# Patient Record
Sex: FEMALE | Race: WHITE | Employment: OTHER | URBAN - METROPOLITAN AREA
[De-identification: names, ages, dates, MRNs, and addresses within clinical notes are randomized per-mention and may not be internally consistent; named-entity substitution may affect disease eponyms.]

---

## 2017-04-13 ENCOUNTER — HOSPITAL ENCOUNTER (EMERGENCY)
Age: 72
Discharge: HOME | End: 2017-04-13
Attending: EMERGENCY MEDICINE
Payer: MEDICARE

## 2017-04-13 ENCOUNTER — APPOINTMENT (OUTPATIENT)
Dept: GENERAL RADIOLOGY | Age: 72
End: 2017-04-13
Attending: EMERGENCY MEDICINE
Payer: MEDICARE

## 2017-04-13 VITALS
RESPIRATION RATE: 16 BRPM | SYSTOLIC BLOOD PRESSURE: 142 MMHG | DIASTOLIC BLOOD PRESSURE: 88 MMHG | HEIGHT: 62 IN | HEART RATE: 82 BPM | OXYGEN SATURATION: 98 % | BODY MASS INDEX: 24.34 KG/M2 | WEIGHT: 132.28 LBS | TEMPERATURE: 98 F

## 2017-04-13 DIAGNOSIS — S42.202A CLOSED FRACTURE OF PROXIMAL END OF LEFT HUMERUS, UNSPECIFIED FRACTURE MORPHOLOGY, INITIAL ENCOUNTER: ICD-10-CM

## 2017-04-13 DIAGNOSIS — S90.112A CONTUSION OF LEFT GREAT TOE WITHOUT DAMAGE TO NAIL, INITIAL ENCOUNTER: Primary | ICD-10-CM

## 2017-04-13 PROCEDURE — 99282 EMERGENCY DEPT VISIT SF MDM: CPT

## 2017-04-13 PROCEDURE — 73660 X-RAY EXAM OF TOE(S): CPT

## 2017-04-13 RX ORDER — HYDROCODONE BITARTRATE AND ACETAMINOPHEN 5; 325 MG/1; MG/1
1 TABLET ORAL
Qty: 20 TAB | Refills: 0 | Status: SHIPPED | OUTPATIENT
Start: 2017-04-13 | End: 2017-11-18

## 2017-04-13 NOTE — ED TRIAGE NOTES
Patient fell on Tuesday while playing with her grandchildren. She was seen at [facility] and was diagnosed with a fracture of the humerus. She was given a sling and told to follow up with orthopedics. She could not get an appointment until next week and her pain was unmanageable with ibuprofen so she's here for pain management. She was not prescribed any pain medicine on Tuesday. She also has ecchymosis to left great toe with pain and limited ROM. No X-ray was performed on toe.

## 2017-04-13 NOTE — ED PROVIDER NOTES
HPI Comments: Alfredo Rose is a 73 y.o. F with PMHx significant for HTN and Cancer who presents ambulatory to ED [facility] ED c/o continuing left arm pain s/p fall on 4/11/17. She also endorses left big toe pain. Pt was seen at [facility] and was diagnosed with a proximal fracture of the humerus. She was given Ibuprofen, placed in a sling and referred to follow up with orthopedics. Pt has not yet secured appointment with orthopedics, and states that her pain was unmanageable with ibuprofen so she is in ED for pain management. She denies any new injury since 4/11. Pt denies any allergies to medications. She specifically denies any fever, nausea, vomiting or diarrhea. There are no other complaints, changes, or physical findings at this time. The history is provided by the patient. Past Medical History:   Diagnosis Date    Cancer ([address]     vulva    Hypertension        Past Surgical History:   Procedure Laterality Date    HX GYN           No family history on file. Social History     Social History    Marital status:      Spouse name: N/A    Number of children: N/A    Years of education: N/A     Occupational History    Not on file. Social History Main Topics    Smoking status: Never Smoker    Smokeless tobacco: Never Used    Alcohol use No    Drug use: No    Sexual activity: Not on file     Other Topics Concern    Not on file     Social History Narrative    No narrative on file         ALLERGIES: Pcn [penicillins]    Review of Systems   Constitutional: Negative for fatigue and fever. HENT: Negative for ear pain and sore throat. Eyes: Negative for pain, redness and visual disturbance. Respiratory: Negative for cough and shortness of breath. Cardiovascular: Negative for chest pain and palpitations. Gastrointestinal: Negative for abdominal pain, diarrhea, nausea and vomiting. Genitourinary: Negative for dysuria, frequency and urgency.    Musculoskeletal: Negative for back pain, gait problem, neck pain and neck stiffness. Positive for left arm pain  Positive for left big toe pain   Skin: Negative for rash and wound. Neurological: Negative for dizziness, weakness, light-headedness, numbness and headaches. All other systems reviewed and are negative. Vitals:    04/13/17 1645   BP: 142/88   Pulse: 82   Resp: 16   Temp: 98 °F (36.7 °C)   SpO2: 98%   Weight: 60 kg (132 lb 4.4 oz)   Height: 5' 2\" (1.575 m)            Physical Exam   Constitutional: She is oriented to person, place, and time. She appears well-developed and well-nourished. Non-toxic appearance. No distress. HENT:   Head: Normocephalic and atraumatic. Right Ear: External ear normal.   Left Ear: External ear normal.   Nose: Nose normal.   Mouth/Throat: Uvula is midline. No trismus in the jaw. Eyes: Conjunctivae and EOM are normal. Pupils are equal, round, and reactive to light. No scleral icterus. Neck: Normal range of motion and full passive range of motion without pain. Cardiovascular: Normal rate and regular rhythm. Pulmonary/Chest: Effort normal. No accessory muscle usage. No tachypnea. No respiratory distress. She has no decreased breath sounds. She has no wheezes. Abdominal: Soft. There is no tenderness. Musculoskeletal: Normal range of motion. Neurological: She is alert and oriented to person, place, and time. She is not disoriented. No cranial nerve deficit. GCS eye subscore is 4. GCS verbal subscore is 5. GCS motor subscore is 6. Tender contusion to the left big toe, no break in the skin   Skin: Skin is intact. No rash noted. Psychiatric: She has a normal mood and affect. Her speech is normal.   Nursing note and vitals reviewed. MDM  Number of Diagnoses or Management Options  Contusion of left great toe without damage to nail, initial encounter:   Diagnosis management comments: DDx: Fracture, Sprain, Strain, Contusion.         Amount and/or Complexity of Data Reviewed  Tests in the radiology section of CPT®: ordered and reviewed  Review and summarize past medical records: yes    Patient Progress  Patient progress: stable    ED Course       Procedures    PROGRESS NOTE:  7:17 PM  Post-op shoe applied by nursing to patients left foot. Written by Diogenes Cervantes ED Scribe, as dictated by Josette Chaidez    IMAGING RESULTS:  XR GREAT TOE LT MIN 2 V   Final Result   EXAM: XR GREAT TOE LT MIN 2 V     INDICATION: fall on Tuesday with swelling and ecchymosis.     COMPARISON: None.     FINDINGS: Three views of the left great toe demonstrate no fracture or other  acute abnormality.     IMPRESSION  IMPRESSION: No acute abnormality. IMPRESSION:  1. Contusion of left great toe without damage to nail, initial encounter    2. Closed fracture of proximal end of left humerus, unspecified fracture morphology, initial encounter        PLAN:  1. Current Discharge Medication List      START taking these medications    Details   HYDROcodone-acetaminophen (NORCO) 5-325 mg per tablet Take 1 Tab by mouth every four (4) hours as needed for Pain. Max Daily Amount: 6 Tabs. Qty: 20 Tab, Refills: 0         CONTINUE these medications which have NOT CHANGED    Details   fluconazole (DIFLUCAN) 100 mg tablet Take 100 mg by mouth daily. diclofenac EC (VOLTAREN) 75 mg EC tablet Take 75 mg by mouth two (2) times a day. HYDROcodone-acetaminophen 5-500 mg cap Take 1 Tab by mouth every six (6) hours as needed. docusate sodium (COLACE) 100 mg capsule Take 100 mg by mouth two (2) times daily as needed. promethazine (PHENERGAN) 25 mg tablet Take 25 mg by mouth every six (6) hours as needed. ranitidine (ZANTAC) 75 mg tablet Take 75 mg by mouth daily as needed. acetaminophen (TYLENOL) 325 mg tablet Take 650 mg by mouth every four (4) hours as needed. calcium 600 mg cap Take 2 Caps by mouth daily.       meclizine (ANTIVERT) 12.5 mg tablet Take 12.5 mg by mouth three (3) times daily as needed. simvastatin (ZOCOR) 20 mg tablet Take 20 mg by mouth daily. aspirin 81 mg tablet Take 81 mg by mouth daily. 2.   Follow-up Information     Follow up With Details Comments [address] Reyes MD Schedule an appointment as soon as possible for a visit ORTHO: call tomorrow to schedule follow up Luana Murry  [phone]          Return to ED if worse     DISCHARGE NOTE:  7:18 PM  The patient's results have been reviewed with family and/or caregiver. They verbally convey their understanding and agreement of the patient's signs, symptoms, diagnosis, treatment, and prognosis. They additionally agree to follow up as recommended in the discharge instructions or to return to the Emergency Room should the patient's condition change prior to their follow-up appointment. The family and/or caregiver verbally agrees with the care-plan and all of their questions have been answered. The discharge instructions have also been provided to the them along with educational information regarding the patient's diagnosis and a list of reasons why the patient would want to return to the ER prior to their follow-up appointment should their condition change. Written by Jesusita Tripp. Robin Rushing, ED Scribe, as dictated by Marci Garner. Attestations: This note is prepared by Jesusita Tripp. Billy, acting as Scribe for Marci Garner. FLIP Grimm: The scribe's documentation has been prepared under my direction and personally reviewed by me in its entirety. I confirm that the note above accurately reflects all work, treatment, procedures, and medical decision making performed by me.

## 2017-04-13 NOTE — ROUTINE PROCESS
The ED provider reviewed discharge instructions with the patient. The patient verbalized understanding.

## 2017-04-13 NOTE — DISCHARGE INSTRUCTIONS
Thank you for allowing us to provide you with care today. We hope we addressed all of your concerns and needs. We strive to provide excellent quality care in the Emergency Department. Please rate us as excellent, as anything less than excellent does not meet our expectations. If you feel that you have not received excellent quality care or timely care, please ask to speak to the nurse manager. Please choose us in the future for your continued health care needs. The exam and treatment you received in the Emergency Department were for an urgent problem and are not intended as complete care. It is important that you follow-up with a doctor, nurse practitioner, or  [id number] assistant to: (1) confirm your diagnosis, (2) re-evaluation of changes in your illness and treatment, and (3) for ongoing care. If your symptoms become worse or you do not improve as expected and you are unable to reach your usual health care provider, you should return to the Emergency Department. We are available 24 hours a day. Take this sheet with you when you go to your follow-up visit. If you have any problem arranging the follow-up visit, contact the Emergency Department immediately. Make an appointment with your Primary Care doctor for follow up of this visit. Return to the ER if you are unable to be seen in the time recommended on your discharge instructions.

## 2017-11-18 ENCOUNTER — APPOINTMENT (OUTPATIENT)
Dept: CT IMAGING | Age: 72
End: 2017-11-18
Attending: EMERGENCY MEDICINE
Payer: MEDICARE

## 2017-11-18 ENCOUNTER — HOSPITAL ENCOUNTER (EMERGENCY)
Age: 72
Discharge: HOME | End: 2017-11-18
Attending: EMERGENCY MEDICINE
Payer: MEDICARE

## 2017-11-18 VITALS
WEIGHT: 140.65 LBS | OXYGEN SATURATION: 94 % | SYSTOLIC BLOOD PRESSURE: 143 MMHG | RESPIRATION RATE: 18 BRPM | DIASTOLIC BLOOD PRESSURE: 67 MMHG | HEART RATE: 76 BPM | BODY MASS INDEX: 26.56 KG/M2 | TEMPERATURE: 98.4 F | HEIGHT: 61 IN

## 2017-11-18 DIAGNOSIS — R42 DIZZINESS: Primary | ICD-10-CM

## 2017-11-18 LAB
ANION GAP SERPL CALC-SCNC: 7 MMOL/L (ref 5–15)
APPEARANCE UR: CLEAR
BACTERIA URNS QL MICRO: NEGATIVE /HPF
BASOPHILS # BLD: 0 K/UL (ref 0–0.1)
BASOPHILS NFR BLD: 1 % (ref 0–1)
BILIRUB UR QL: NEGATIVE
BUN SERPL-MCNC: 11 MG/DL (ref 6–20)
BUN/CREAT SERPL: 16 (ref 12–20)
CALCIUM SERPL-MCNC: 8.5 MG/DL (ref 8.5–10.1)
CHLORIDE SERPL-SCNC: 110 MMOL/L (ref 97–108)
CO2 SERPL-SCNC: 27 MMOL/L (ref 21–32)
COLOR UR: NORMAL
CREAT SERPL-MCNC: 0.68 MG/DL (ref 0.55–1.02)
EOSINOPHIL # BLD: 0.1 K/UL (ref 0–0.4)
EOSINOPHIL NFR BLD: 1 % (ref 0–7)
EPITH CASTS URNS QL MICRO: NORMAL /LPF
ERYTHROCYTE [DISTWIDTH] IN BLOOD BY AUTOMATED COUNT: 13 % (ref 11.5–14.5)
GLUCOSE SERPL-MCNC: 82 MG/DL (ref 65–100)
GLUCOSE UR STRIP.AUTO-MCNC: NEGATIVE MG/DL
HCT VFR BLD AUTO: 41.8 % (ref 35–47)
HGB BLD-MCNC: 13.7 G/DL (ref 11.5–16)
HGB UR QL STRIP: NEGATIVE
KETONES UR QL STRIP.AUTO: NEGATIVE MG/DL
LEUKOCYTE ESTERASE UR QL STRIP.AUTO: NEGATIVE
LYMPHOCYTES # BLD: 1.6 K/UL (ref 0.8–3.5)
LYMPHOCYTES NFR BLD: 25 % (ref 12–49)
MCH RBC QN AUTO: 28.8 PG (ref 26–34)
MCHC RBC AUTO-ENTMCNC: 32.8 G/DL (ref 30–36.5)
MCV RBC AUTO: 87.8 FL (ref 80–99)
MONOCYTES # BLD: 0.5 K/UL (ref 0–1)
MONOCYTES NFR BLD: 8 % (ref 5–13)
NEUTS SEG # BLD: 4.1 K/UL (ref 1.8–8)
NEUTS SEG NFR BLD: 65 % (ref 32–75)
NITRITE UR QL STRIP.AUTO: NEGATIVE
PH UR STRIP: 7 [PH] (ref 5–8)
PLATELET # BLD AUTO: 222 K/UL (ref 150–400)
POTASSIUM SERPL-SCNC: 3.9 MMOL/L (ref 3.5–5.1)
PROT UR STRIP-MCNC: NEGATIVE MG/DL
RBC # BLD AUTO: 4.76 M/UL (ref 3.8–5.2)
RBC #/AREA URNS HPF: NORMAL /HPF (ref 0–5)
SODIUM SERPL-SCNC: 144 MMOL/L (ref 136–145)
SP GR UR REFRACTOMETRY: 1.02 (ref 1–1.03)
UROBILINOGEN UR QL STRIP.AUTO: 1 EU/DL (ref 0.2–1)
WBC # BLD AUTO: 6.3 K/UL (ref 3.6–11)
WBC URNS QL MICRO: NORMAL /HPF (ref 0–4)

## 2017-11-18 PROCEDURE — 96374 THER/PROPH/DIAG INJ IV PUSH: CPT

## 2017-11-18 PROCEDURE — 74011250636 HC RX REV CODE- 250/636: Performed by: EMERGENCY MEDICINE

## 2017-11-18 PROCEDURE — 81001 URINALYSIS AUTO W/SCOPE: CPT | Performed by: EMERGENCY MEDICINE

## 2017-11-18 PROCEDURE — 96375 TX/PRO/DX INJ NEW DRUG ADDON: CPT

## 2017-11-18 PROCEDURE — 36415 COLL VENOUS BLD VENIPUNCTURE: CPT | Performed by: EMERGENCY MEDICINE

## 2017-11-18 PROCEDURE — 85025 COMPLETE CBC W/AUTO DIFF WBC: CPT | Performed by: EMERGENCY MEDICINE

## 2017-11-18 PROCEDURE — 93005 ELECTROCARDIOGRAM TRACING: CPT

## 2017-11-18 PROCEDURE — 99285 EMERGENCY DEPT VISIT HI MDM: CPT

## 2017-11-18 PROCEDURE — 80048 BASIC METABOLIC PNL TOTAL CA: CPT | Performed by: EMERGENCY MEDICINE

## 2017-11-18 PROCEDURE — 70450 CT HEAD/BRAIN W/O DYE: CPT

## 2017-11-18 PROCEDURE — 96361 HYDRATE IV INFUSION ADD-ON: CPT

## 2017-11-18 RX ORDER — CLONAZEPAM 0.5 MG/1
TABLET ORAL
COMMUNITY

## 2017-11-18 RX ORDER — MECLIZINE HCL 12.5 MG 12.5 MG/1
25 TABLET ORAL
Status: COMPLETED | OUTPATIENT
Start: 2017-11-18 | End: 2017-11-18

## 2017-11-18 RX ORDER — KETOROLAC TROMETHAMINE 30 MG/ML
15 INJECTION, SOLUTION INTRAMUSCULAR; INTRAVENOUS
Status: COMPLETED | OUTPATIENT
Start: 2017-11-18 | End: 2017-11-18

## 2017-11-18 RX ORDER — DIPHENHYDRAMINE HCL 25 MG
25 TABLET ORAL
COMMUNITY
End: 2018-05-01

## 2017-11-18 RX ORDER — ONDANSETRON 2 MG/ML
4 INJECTION INTRAMUSCULAR; INTRAVENOUS
Status: COMPLETED | OUTPATIENT
Start: 2017-11-18 | End: 2017-11-18

## 2017-11-18 RX ORDER — DOCUSATE SODIUM 100 MG/1
100 CAPSULE, LIQUID FILLED ORAL
COMMUNITY

## 2017-11-18 RX ORDER — SODIUM CHLORIDE 0.9 % (FLUSH) 0.9 %
5-10 SYRINGE (ML) INJECTION EVERY 8 HOURS
Status: DISCONTINUED | OUTPATIENT
Start: 2017-11-18 | End: 2017-11-18 | Stop reason: HOSPADM

## 2017-11-18 RX ORDER — LOPERAMIDE HYDROCHLORIDE 2 MG/1
CAPSULE ORAL
COMMUNITY
End: 2020-04-21 | Stop reason: ALTCHOICE

## 2017-11-18 RX ORDER — ONDANSETRON 4 MG/1
4 TABLET, ORALLY DISINTEGRATING ORAL
Qty: 10 TAB | Refills: 0 | Status: SHIPPED | OUTPATIENT
Start: 2017-11-18 | End: 2021-01-21 | Stop reason: ALTCHOICE

## 2017-11-18 RX ORDER — MECLIZINE HYDROCHLORIDE 25 MG/1
25 TABLET ORAL
Qty: 20 TAB | Refills: 0 | Status: SHIPPED | OUTPATIENT
Start: 2017-11-18

## 2017-11-18 RX ORDER — SODIUM CHLORIDE 0.9 % (FLUSH) 0.9 %
5-10 SYRINGE (ML) INJECTION AS NEEDED
Status: DISCONTINUED | OUTPATIENT
Start: 2017-11-18 | End: 2017-11-18 | Stop reason: HOSPADM

## 2017-11-18 RX ORDER — BISMUTH SUBSALICYLATE 262 MG
1 TABLET,CHEWABLE ORAL DAILY
COMMUNITY

## 2017-11-18 RX ADMIN — ONDANSETRON HYDROCHLORIDE 4 MG: 2 INJECTION, SOLUTION INTRAMUSCULAR; INTRAVENOUS at 15:59

## 2017-11-18 RX ADMIN — KETOROLAC TROMETHAMINE 15 MG: 30 INJECTION, SOLUTION INTRAMUSCULAR at 15:59

## 2017-11-18 RX ADMIN — Medication 10 ML: at 16:00

## 2017-11-18 RX ADMIN — SODIUM CHLORIDE 1000 ML: 900 INJECTION, SOLUTION INTRAVENOUS at 15:57

## 2017-11-18 RX ADMIN — MECLIZINE 25 MG: 12.5 TABLET ORAL at 15:59

## 2017-11-18 NOTE — ED NOTES
Patient ambulatory to ED with c/o off balance and nervousness starting Thursday. Patient stated she fell back in April and hit the back of her head. Patient states she has neck pain when trying to look side to side.

## 2017-11-18 NOTE — ED NOTES
Pt received discharge instructions and verbalized understanding. Pt ambulatory to exit with a steady gait.

## 2017-11-18 NOTE — ED PROVIDER NOTES
[facility] [address]  EMERGENCY DEPARTMENT HISTORY AND PHYSICAL EXAM       Date of Service: 11/18/2017   Patient Name: Wendy Donald   YOB: 1945  Medical Record Number: [id number]    History of Presenting Illness     Chief Complaint   Patient presents with    Headache     pt reports onset Wednesday and states \"it all started after I got the flu shot\"    Diarrhea     onset Wednesday    Dizziness     pt reports dizziness and unsteady gait Wednesday        History Provided By:  patient    Additional History:   Wendy Donald is a 81 y.o. female with PMhx significant for vertigo and anxiety who presents ambulatory to the ED with cc of constant dizziness, waxing and waning in intensity, with associated unsteady gait and HA x 3 days. She notes some trouble writing, and endorses needing assistance to ambulate. Pt reports taking Meclizine yesterday with temporary relief, and notes taking Tylenol for headache with some relief. Pt reports falling on grass while playing volleyball with her grandchildren, and notes having a HA since the incident. Pt also reports having a mild intermittent cough. She states diarrhea resolved yesterday along with associated abdominal cramping. Pt specifically denies any fever or cold symptoms. Social Hx: - Tobacco, - EtOH, - Illicit Drugs    There are no other complaints, changes or physical findings at this time. Primary Care Provider: Pablo Nunez MD     Past History     Past Medical History:   Past Medical History:   Diagnosis Date    Cancer ([facility] [address]     vulva    Hypertension     Shoulder fracture, left         Past Surgical History:   Past Surgical History:   Procedure Laterality Date    HX CHOLECYSTECTOMY      HX GYN          Family History:   History reviewed. No pertinent family history.      Social History:   Social History   Substance Use Topics    Smoking status: Never Smoker    Smokeless tobacco: Never Used    Alcohol use No Allergies: Allergies   Allergen Reactions    Pcn [Penicillins] Hives         Review of Systems   Review of Systems   Constitutional: Positive for chills. Negative for appetite change, fatigue and fever. HENT: Negative. Negative for congestion, rhinorrhea, sinus pressure and sore throat. Eyes: Negative. Respiratory: Negative. Negative for cough, choking, chest tightness, shortness of breath and wheezing. Cardiovascular: Negative. Negative for chest pain, palpitations and leg swelling. Gastrointestinal: Negative for abdominal pain, constipation, diarrhea, nausea and vomiting. Endocrine: Negative. Genitourinary: Negative. Negative for difficulty urinating, dysuria, flank pain and urgency. Musculoskeletal: Negative. Skin: Negative. Neurological: Positive for dizziness and headaches. Negative for speech difficulty, weakness, light-headedness and numbness. Psychiatric/Behavioral: Negative. All other systems reviewed and are negative. Physical Exam  Physical Exam   Constitutional: She is oriented to person, place, and time. She appears well-developed and well-nourished. No distress. HENT:   Head: Normocephalic and atraumatic. Right Ear: External ear normal.   Left Ear: External ear normal.   Mouth/Throat: Oropharynx is clear and moist. No oropharyngeal exudate. Eyes: Conjunctivae and EOM are normal. Pupils are equal, round, and reactive to light. Neck: Normal range of motion. Neck supple. No JVD present. No tracheal deviation present. Cardiovascular: Normal rate, regular rhythm, normal heart sounds and intact distal pulses. No murmur heard. Pulmonary/Chest: Effort normal and breath sounds normal. No stridor. No respiratory distress. She has no wheezes. She has no rales. She exhibits no tenderness. Abdominal: Soft. She exhibits no distension. There is no tenderness. There is no rebound and no guarding. Musculoskeletal: Normal range of motion.  She exhibits no edema or tenderness. Neurological: She is alert and oriented to person, place, and time. No cranial nerve deficit. No focal motor or sensory deficits, no dysmetria, no ataxia   Skin: Skin is warm and dry. She is not diaphoretic. Psychiatric: She has a normal mood and affect. Her behavior is normal.   Nursing note and vitals reviewed. Medical Decision Making   I am the first provider for this patient. I reviewed the vital signs, available nursing notes, past medical history, past surgical history, family history and social history. Old Medical Records: Old medical records. Nursing notes. Provider Notes:   DDx: vertigo, electrolyte abnormality, post concussive syndrome    ED Course:  2:35 PM   Initial assessment performed. The patients presenting problems have been discussed, and they are in agreement with the care plan formulated and outlined with them. I have encouraged them to ask questions as they arise throughout their visit.       Diagnostic Study Results   Labs -  Recent Results (from the past 12 hour(s))   EKG, 12 LEAD, INITIAL    Collection Time: 11/18/17 12:26 PM   Result Value Ref Range    Ventricular Rate 77 BPM    Atrial Rate 77 BPM    P-R Interval 166 ms    QRS Duration 136 ms    Q-T Interval 408 ms    QTC Calculation (Bezet) 461 ms    Calculated P Axis 67 degrees    Calculated R Axis -69 degrees    Calculated T Axis 32 degrees    Diagnosis       Normal sinus rhythm  Right bundle branch block  Left anterior fascicular block  ** Bifascicular block **  Left ventricular hypertrophy with QRS widening  Abnormal ECG  When compared with ECG of 10-MAR-2011 14:05,  (RBBB and left anterior fascicular block) is now present     CBC WITH AUTOMATED DIFF    Collection Time: 11/18/17  3:45 PM   Result Value Ref Range    WBC 6.3 3.6 - 11.0 K/uL    RBC 4.76 3.80 - 5.20 M/uL    HGB 13.7 11.5 - 16.0 g/dL    HCT 41.8 35.0 - 47.0 %    MCV 87.8 80.0 - 99.0 FL    MCH 28.8 26.0 - 34.0 PG    MCHC 32.8 30.0 - 36.5 g/dL    RDW 13.0 11.5 - 14.5 %    PLATELET 254 890 - 691 K/uL    NEUTROPHILS 65 32 - 75 %    LYMPHOCYTES 25 12 - 49 %    MONOCYTES 8 5 - 13 %    EOSINOPHILS 1 0 - 7 %    BASOPHILS 1 0 - 1 %    ABS. NEUTROPHILS 4.1 1.8 - 8.0 K/UL    ABS. LYMPHOCYTES 1.6 0.8 - 3.5 K/UL    ABS. MONOCYTES 0.5 0.0 - 1.0 K/UL    ABS. EOSINOPHILS 0.1 0.0 - 0.4 K/UL    ABS. BASOPHILS 0.0 0.0 - 0.1 K/UL   METABOLIC PANEL, BASIC    Collection Time: 11/18/17  3:45 PM   Result Value Ref Range    Sodium 144 136 - 145 mmol/L    Potassium 3.9 3.5 - 5.1 mmol/L    Chloride 110 (H) 97 - 108 mmol/L    CO2 27 21 - 32 mmol/L    Anion gap 7 5 - 15 mmol/L    Glucose 82 65 - 100 mg/dL    BUN 11 6 - 20 MG/DL    Creatinine 0.68 0.55 - 1.02 MG/DL    BUN/Creatinine ratio 16 12 - 20      GFR est AA >60 >60 ml/min/1.73m2    GFR est non-AA >60 >60 ml/min/1.73m2    Calcium 8.5 8.5 - 10.1 MG/DL   URINALYSIS W/MICROSCOPIC    Collection Time: 11/18/17  3:46 PM   Result Value Ref Range    Color YELLOW/STRAW      Appearance CLEAR CLEAR      Specific gravity 1.022 1.003 - 1.030      pH (UA) 7.0 5.0 - 8.0      Protein NEGATIVE  NEG mg/dL    Glucose NEGATIVE  NEG mg/dL    Ketone NEGATIVE  NEG mg/dL    Bilirubin NEGATIVE  NEG      Blood NEGATIVE  NEG      Urobilinogen 1.0 0.2 - 1.0 EU/dL    Nitrites NEGATIVE  NEG      Leukocyte Esterase NEGATIVE  NEG      WBC 0-4 0 - 4 /hpf    RBC 0-5 0 - 5 /hpf    Epithelial cells FEW FEW /lpf    Bacteria NEGATIVE  NEG /hpf       Radiologic Studies -  The following have been ordered and reviewed:    CT Results  (Last 48 hours)               11/18/17 1522  CT HEAD WO CONT Final result    Impression:  IMPRESSION: No acute intracranial abnormality. Narrative:  EXAM:  CT HEAD WITHOUT CONTRAST   INDICATION: Off balance with head trauma in April 2017. COMPARISON: None. CONTRAST: None. TECHNIQUE: Unenhanced CT of the head was performed using 5 mm images. Brain and   bone windows were generated.  Sagittal and coronal reformations were generated. CT dose reduction was achieved through use of a standardized protocol tailored   for this examination and automatic exposure control for dose modulation. CT dose   reduction was achieved through use of a standardized protocol tailored for this   examination and automatic exposure control for dose modulation. FINDINGS:   The ventricles and sulci are normal in size, shape and configuration and   midline. There is atherosclerotic calcification of the vertebral carotid   arteries with no significant white matter disease. There is no intracranial   hemorrhage. There is no extra-axial collection, mass, mass effect or midline   shift. The basilar cisterns are open. No acute infarct is identified. The bone   windows demonstrate no abnormalities. The visualized portions of the paranasal   sinuses and mastoid air cells are clear. Vital Signs-Reviewed the patient's vital signs.    Patient Vitals for the past 12 hrs:   Temp Pulse Resp BP SpO2   11/18/17 1503 - - - 160/71 97 %   11/18/17 1445 - - - 146/64 96 %   11/18/17 1430 - - - 132/63 94 %   11/18/17 1415 - - - 131/67 95 %   11/18/17 1412 - - - 133/68 -   11/18/17 1219 98.4 °F (36.9 °C) 76 18 (!) 121/95 100 %       Medications Given in the ED:  Medications   sodium chloride (NS) flush 5-10 mL (10 mL IntraVENous Given 11/18/17 1600)   sodium chloride (NS) flush 5-10 mL (10 mL IntraVENous Given 11/18/17 1600)   sodium chloride 0.9 % bolus infusion 1,000 mL (0 mL IntraVENous IV Completed 11/18/17 1702)   ondansetron (ZOFRAN) injection 4 mg (4 mg IntraVENous Given 11/18/17 1559)   ketorolac (TORADOL) injection 15 mg (15 mg IntraVENous Given 11/18/17 1559)   meclizine (ANTIVERT) tablet 25 mg (25 mg Oral Given 11/18/17 1559)       Pulse Oximetry Analysis - Normal 97% on room air     Cardiac Monitor:   Rate: 76 bpm    EKG interpretation: (Preliminary)  12:26  Rhythm: normal sinus rhythm and RBBB, Left anterior fascicular block, Bifascicular block; and regular . Rate (approx.): 77 bpm; Axis: normal; CA interval: normal; QRS interval: prolonged; ST/T wave: normal; Other findings: left ventricular hypertrophy. When compared with ECG of 10-MAR-2011 14:05, RBBB and left anterior fascicular block are now present  Written by Hudson Bean ED Scribe, as dictated by Ganesh Rosas DO    Diagnosis   Clinical Impression:   1. Dizziness         Plan:  1:   Follow-up Information     Follow up With Details Comments Contact Info    Pablo Nunez MD  As needed Patient can only remember the practice name and not the physician          2:   Current Discharge Medication List      START taking these medications    Details   ondansetron (ZOFRAN ODT) 4 mg disintegrating tablet Take 1 Tab by mouth every eight (8) hours as needed for Nausea. Qty: 10 Tab, Refills: 0         CONTINUE these medications which have CHANGED    Details   meclizine (ANTIVERT) 25 mg tablet Take 1 Tab by mouth three (3) times daily as needed for Dizziness. Qty: 20 Tab, Refills: 0           Return to ED if Worse    Disposition Note:  DISCHARGE NOTE:  6:08 PM  The patient has been re-evaluated and is ready for discharge. Reviewed available results with patient. Counseled patient on diagnosis and care plan. Patient has expressed understanding, and all questions have been answered. Patient agrees with plan and agrees to follow up as recommended, or return to the ED if their symptoms worsen. Discharge instructions have been provided and explained to the patient, along with reasons to return to the ED.  _______________________________   Attestations:     ATTESTATION:  This note is prepared by Hudson Bean, acting as Scribe for Martita Blackwood DO: The scribe's documentation has been prepared under my direction and personally reviewed by me in its entirety.  I confirm that the note above accurately reflects all work, treatment, procedures, and medical decision making performed by me.  _______________________________

## 2017-11-18 NOTE — ED NOTES
Bedside and Verbal shift change report given to Edd Obrien RN (oncoming nurse) by Corby Veloz RN (offgoing nurse). Report included the following information SBAR, ED Summary, MAR and Recent Results.

## 2017-11-18 NOTE — DISCHARGE INSTRUCTIONS
Lightheadedness or Faintness: Care Instructions  Your Care Instructions  Lightheadedness is a feeling that you are about to faint or \"pass out. \" You do not feel as if you or your surroundings are moving. It is different from vertigo, which is the feeling that you or things around you are spinning or tilting. Lightheadedness usually goes away or gets better when you lie down. If lightheadedness gets worse, it can lead to a fainting spell. It is common to feel lightheaded from time to time. Lightheadedness usually is not caused by a serious problem. It often is caused by a short-lasting drop in blood pressure and blood flow to your head that occurs when you get up too quickly from a seated or lying position. Follow-up care is a key part of your treatment and safety. Be sure to make and go to all appointments, and call your doctor if you are having problems. It's also a good idea to know your test results and keep a list of the medicines you take. How can you care for yourself at home? · Lie down for 1 or 2 minutes when you feel lightheaded. After lying down, sit up slowly and remain sitting for 1 to 2 minutes before slowly standing up. · Avoid movements, positions, or activities that have made you lightheaded in the past.  · Get plenty of rest, especially if you have a cold or flu, which can cause lightheadedness. · Make sure you drink plenty of fluids, especially if you have a fever or have been sweating. · Do not drive or put yourself and others in danger while you feel lightheaded. When should you call for help? Call 911 anytime you think you may need emergency care. For example, call if:  ? · You have symptoms of a stroke. These may include:  ¨ Sudden numbness, tingling, weakness, or loss of movement in your face, arm, or leg, especially on only one side of your body. ¨ Sudden vision changes. ¨ Sudden trouble speaking. ¨ Sudden confusion or trouble understanding simple statements.   ¨ Sudden problems with walking or balance. ¨ A sudden, severe headache that is different from past headaches. ? · You have symptoms of a heart attack. These may include:  ¨ Chest pain or pressure, or a strange feeling in the chest.  ¨ Sweating. ¨ Shortness of breath. ¨ Nausea or vomiting. ¨ Pain, pressure, or a strange feeling in the back, neck, jaw, or upper belly or in one or both shoulders or arms. ¨ Lightheadedness or sudden weakness. ¨ A fast or irregular heartbeat. After you call 911, the  may tell you to chew 1 adult-strength or 2 to 4 low-dose aspirin. Wait for an ambulance. Do not try to drive yourself. ? Watch closely for changes in your health, and be sure to contact your doctor if:  ? · Your lightheadedness gets worse or does not get better with home care. Where can you learn more? Go to http://huberWesabeselvin.info/. Enter Q468 in the search box to learn more about \"Lightheadedness or Faintness: Care Instructions. \"  Current as of: March 20, 2017  Content Version: 11.4  © 3327-9728 [employer]. Care instructions adapted under license by [employer] (which disclaims liability or warranty for this information). If you have questions about a medical condition or this instruction, always ask your healthcare professional. [address] any warranty or liability for your use of this information. Vertigo: Care Instructions  Your Care Instructions    Vertigo is the feeling that you or your surroundings are moving when there is no actual movement. It is often described as a feeling of spinning, whirling, falling, or tilting. Vertigo may make you vomit or feel nauseated. You may have trouble standing or walking and may lose your balance. Vertigo is often related to an inner ear problem, but it can have other more serious causes. If vertigo continues, you may need more tests to find its cause.   Follow-up care is a key part of your treatment and safety. Be sure to make and go to all appointments, and call your doctor if you are having problems. It's also a good idea to know your test results and keep a list of the medicines you take. How can you care for yourself at home? · Do not lie flat on your back. Prop yourself up slightly. This may reduce the spinning feeling. Keep your eyes open. · Move slowly so that you do not fall. · If your doctor recommends medicine, take it exactly as directed. · Do not drive while you are having vertigo. Certain exercises, called Salguero-Daroff exercises, can help decrease vertigo. To do Salguero-Daroff exercises:  · Sit on the edge of a bed or sofa and quickly lie down on the side that causes the worst vertigo. Lie on your side with your ear down. · Stay in this position for at least 30 seconds or until the vertigo goes away. · Sit up. If this causes vertigo, wait for it to stop. · Repeat the procedure on the other side. · Repeat this 10 times. Do these exercises 2 times a day until the vertigo is gone. When should you call for help? Call 911 anytime you think you may need emergency care. For example, call if:  ? · You passed out (lost consciousness). ? · You have symptoms of a stroke. These may include:  ¨ Sudden numbness, tingling, weakness, or loss of movement in your face, arm, or leg, especially on only one side of your body. ¨ Sudden vision changes. ¨ Sudden trouble speaking. ¨ Sudden confusion or trouble understanding simple statements. ¨ Sudden problems with walking or balance. ¨ A sudden, severe headache that is different from past headaches. ?Call your doctor now or seek immediate medical care if:  ? · Vertigo occurs with a fever, a headache, or ringing in your ears. ? · You have new or increased nausea and vomiting. ? Watch closely for changes in your health, and be sure to contact your doctor if:  ? · Vertigo gets worse or happens more often.    ? · Vertigo has not gotten better after 2 weeks. Where can you learn more? Go to http://huber-selvin.info/. Enter E519 in the search box to learn more about \"Vertigo: Care Instructions. \"  Current as of: May 12, 2017  Content Version: 11.4  © 1429-7213 [employer], [place]. Care instructions adapted under license by [employer] (which disclaims liability or warranty for this information). If you have questions about a medical condition or this instruction, always ask your healthcare professional. [address] any warranty or liability for your use of this information.

## 2017-11-19 LAB
ATRIAL RATE: 77 BPM
CALCULATED P AXIS, ECG09: 67 DEGREES
CALCULATED R AXIS, ECG10: -69 DEGREES
CALCULATED T AXIS, ECG11: 32 DEGREES
DIAGNOSIS, 93000: NORMAL
P-R INTERVAL, ECG05: 166 MS
Q-T INTERVAL, ECG07: 408 MS
QRS DURATION, ECG06: 136 MS
QTC CALCULATION (BEZET), ECG08: 461 MS
VENTRICULAR RATE, ECG03: 77 BPM

## 2017-12-05 ENCOUNTER — APPOINTMENT (OUTPATIENT)
Dept: CT IMAGING | Age: 72
End: 2017-12-05
Attending: EMERGENCY MEDICINE
Payer: MEDICARE

## 2017-12-05 ENCOUNTER — APPOINTMENT (OUTPATIENT)
Dept: GENERAL RADIOLOGY | Age: 72
End: 2017-12-05
Attending: EMERGENCY MEDICINE
Payer: MEDICARE

## 2017-12-05 ENCOUNTER — HOSPITAL ENCOUNTER (EMERGENCY)
Age: 72
Discharge: HOME | End: 2017-12-05
Attending: EMERGENCY MEDICINE
Payer: MEDICARE

## 2017-12-05 VITALS
DIASTOLIC BLOOD PRESSURE: 77 MMHG | RESPIRATION RATE: 15 BRPM | SYSTOLIC BLOOD PRESSURE: 122 MMHG | BODY MASS INDEX: 25.44 KG/M2 | TEMPERATURE: 98.4 F | HEIGHT: 62 IN | HEART RATE: 71 BPM | WEIGHT: 138.23 LBS | OXYGEN SATURATION: 99 %

## 2017-12-05 DIAGNOSIS — R07.89 ATYPICAL CHEST PAIN: Primary | ICD-10-CM

## 2017-12-05 LAB
ALBUMIN SERPL-MCNC: 3.3 G/DL (ref 3.5–5)
ALBUMIN/GLOB SERPL: 0.9 {RATIO} (ref 1.1–2.2)
ALP SERPL-CCNC: 79 U/L (ref 45–117)
ALT SERPL-CCNC: 50 U/L (ref 12–78)
ANION GAP SERPL CALC-SCNC: 6 MMOL/L (ref 5–15)
APPEARANCE UR: ABNORMAL
AST SERPL-CCNC: 46 U/L (ref 15–37)
ATRIAL RATE: 73 BPM
BACTERIA URNS QL MICRO: ABNORMAL /HPF
BASOPHILS # BLD: 0 K/UL (ref 0–0.1)
BASOPHILS NFR BLD: 0 % (ref 0–1)
BILIRUB SERPL-MCNC: 0.2 MG/DL (ref 0.2–1)
BILIRUB UR QL: NEGATIVE
BUN SERPL-MCNC: 13 MG/DL (ref 6–20)
BUN/CREAT SERPL: 18 (ref 12–20)
CALCIUM SERPL-MCNC: 8.5 MG/DL (ref 8.5–10.1)
CALCULATED P AXIS, ECG09: 64 DEGREES
CALCULATED R AXIS, ECG10: -65 DEGREES
CALCULATED T AXIS, ECG11: 15 DEGREES
CHLORIDE SERPL-SCNC: 104 MMOL/L (ref 97–108)
CO2 SERPL-SCNC: 32 MMOL/L (ref 21–32)
COLOR UR: ABNORMAL
CREAT SERPL-MCNC: 0.74 MG/DL (ref 0.55–1.02)
DIAGNOSIS, 93000: NORMAL
EOSINOPHIL # BLD: 0.1 K/UL (ref 0–0.4)
EOSINOPHIL NFR BLD: 1 % (ref 0–7)
EPITH CASTS URNS QL MICRO: ABNORMAL /LPF
ERYTHROCYTE [DISTWIDTH] IN BLOOD BY AUTOMATED COUNT: 13 % (ref 11.5–14.5)
GLOBULIN SER CALC-MCNC: 3.6 G/DL (ref 2–4)
GLUCOSE SERPL-MCNC: 144 MG/DL (ref 65–100)
GLUCOSE UR STRIP.AUTO-MCNC: NEGATIVE MG/DL
HCT VFR BLD AUTO: 39.5 % (ref 35–47)
HGB BLD-MCNC: 13.2 G/DL (ref 11.5–16)
HGB UR QL STRIP: ABNORMAL
KETONES UR QL STRIP.AUTO: NEGATIVE MG/DL
LEUKOCYTE ESTERASE UR QL STRIP.AUTO: ABNORMAL
LYMPHOCYTES # BLD: 1.2 K/UL (ref 0.8–3.5)
LYMPHOCYTES NFR BLD: 15 % (ref 12–49)
MCH RBC QN AUTO: 29.2 PG (ref 26–34)
MCHC RBC AUTO-ENTMCNC: 33.4 G/DL (ref 30–36.5)
MCV RBC AUTO: 87.4 FL (ref 80–99)
MONOCYTES # BLD: 0.6 K/UL (ref 0–1)
MONOCYTES NFR BLD: 8 % (ref 5–13)
NEUTS SEG # BLD: 5.8 K/UL (ref 1.8–8)
NEUTS SEG NFR BLD: 76 % (ref 32–75)
NITRITE UR QL STRIP.AUTO: NEGATIVE
OTHER,OTHU: ABNORMAL
P-R INTERVAL, ECG05: 170 MS
PH UR STRIP: 6 [PH] (ref 5–8)
PLATELET # BLD AUTO: 233 K/UL (ref 150–400)
POTASSIUM SERPL-SCNC: 3.5 MMOL/L (ref 3.5–5.1)
PROT SERPL-MCNC: 6.9 G/DL (ref 6.4–8.2)
PROT UR STRIP-MCNC: NEGATIVE MG/DL
Q-T INTERVAL, ECG07: 422 MS
QRS DURATION, ECG06: 138 MS
QTC CALCULATION (BEZET), ECG08: 464 MS
RBC # BLD AUTO: 4.52 M/UL (ref 3.8–5.2)
RBC #/AREA URNS HPF: ABNORMAL /HPF (ref 0–5)
SODIUM SERPL-SCNC: 142 MMOL/L (ref 136–145)
SP GR UR REFRACTOMETRY: 1.02 (ref 1–1.03)
TROPONIN I SERPL-MCNC: <0.04 NG/ML
UA: UC IF INDICATED,UAUC: ABNORMAL
UROBILINOGEN UR QL STRIP.AUTO: 0.2 EU/DL (ref 0.2–1)
VENTRICULAR RATE, ECG03: 73 BPM
WBC # BLD AUTO: 7.8 K/UL (ref 3.6–11)
WBC URNS QL MICRO: ABNORMAL /HPF (ref 0–4)

## 2017-12-05 PROCEDURE — 99285 EMERGENCY DEPT VISIT HI MDM: CPT

## 2017-12-05 PROCEDURE — 81001 URINALYSIS AUTO W/SCOPE: CPT | Performed by: EMERGENCY MEDICINE

## 2017-12-05 PROCEDURE — 71010 XR CHEST PORT: CPT

## 2017-12-05 PROCEDURE — 74011250636 HC RX REV CODE- 250/636: Performed by: EMERGENCY MEDICINE

## 2017-12-05 PROCEDURE — 71275 CT ANGIOGRAPHY CHEST: CPT

## 2017-12-05 PROCEDURE — 74011636320 HC RX REV CODE- 636/320: Performed by: EMERGENCY MEDICINE

## 2017-12-05 PROCEDURE — 80053 COMPREHEN METABOLIC PANEL: CPT | Performed by: EMERGENCY MEDICINE

## 2017-12-05 PROCEDURE — 93005 ELECTROCARDIOGRAM TRACING: CPT

## 2017-12-05 PROCEDURE — 36415 COLL VENOUS BLD VENIPUNCTURE: CPT | Performed by: EMERGENCY MEDICINE

## 2017-12-05 PROCEDURE — 87086 URINE CULTURE/COLONY COUNT: CPT | Performed by: EMERGENCY MEDICINE

## 2017-12-05 PROCEDURE — 84484 ASSAY OF TROPONIN QUANT: CPT | Performed by: EMERGENCY MEDICINE

## 2017-12-05 PROCEDURE — 70450 CT HEAD/BRAIN W/O DYE: CPT

## 2017-12-05 PROCEDURE — 85025 COMPLETE CBC W/AUTO DIFF WBC: CPT | Performed by: EMERGENCY MEDICINE

## 2017-12-05 RX ORDER — SODIUM CHLORIDE 0.9 % (FLUSH) 0.9 %
10 SYRINGE (ML) INJECTION
Status: COMPLETED | OUTPATIENT
Start: 2017-12-05 | End: 2017-12-05

## 2017-12-05 RX ORDER — SODIUM CHLORIDE 9 MG/ML
50 INJECTION, SOLUTION INTRAVENOUS
Status: COMPLETED | OUTPATIENT
Start: 2017-12-05 | End: 2017-12-05

## 2017-12-05 RX ADMIN — IOPAMIDOL 100 ML: 755 INJECTION, SOLUTION INTRAVENOUS at 17:58

## 2017-12-05 RX ADMIN — SODIUM CHLORIDE 50 ML/HR: 900 INJECTION, SOLUTION INTRAVENOUS at 17:58

## 2017-12-05 RX ADMIN — Medication 10 ML: at 17:58

## 2017-12-05 NOTE — ED PROVIDER NOTES
EMERGENCY DEPARTMENT HISTORY AND PHYSICAL EXAM      Date: 12/5/2017  Patient Name: Masood Hinojosa    History of Presenting Illness     Chief Complaint   Patient presents with    Chest Pain     intermittent mid chest pain onset this am, +sob, +nasuea       History Provided By: Patient and Patient's Son     HPI: Masood Hinojosa, 81 y.o. female with PMHx significant for HTN, vulvar CA, presents ambulatory to the ED with cc of sudden onset of substernal CP described as pressure with associated mild dry cough x today. Pt presents to the ED today with multiple vague complaints consisting of slurred speech, R sided weakness and gait instability that have been going on for multiple weeks. Pt has already been evaluated at [facility] ED x2, PCP x2 and neurology. Pt is poor historian and is unable to recall the date of onset of her sxs. Her son reports that he spoke with the pt a few weeks ago and noticed that her speech was slurred \"as though she was drunk\". He brought the pt to [facility] ED the next day, where she received a neurology consult and the instructions to follow up with neurology outpatient to further evaluate for CVA. Pt saw her new neurologist yesterday and notes that she has an MRI scheduled on 12/7/2017. Of note, pt was seen at the [address] [place] [address] ED on 11/18/2017 for similar sxs and was d/c with Meclizine, which she reports has provided her no relief of her sxs. Pt's son is still concerned as her weakness, slurred speech and gait instability continue to be a problem, despite them getting better since initial onset,so he brought her to the [facility] ED, and as he was not satisfied with her care there, brought her into the [address] [place] [address] ED for further evaluation. . Pt is taking 81mg ASA daily and has neurology follow up scheduled in January 2018. Pt specifically denies cardiac hx, nausea, vomiting, fever, chills or dizziness. PCP: Pablo Nunez MD    HPI is limited due to pt being a poor historian.     Current Outpatient Prescriptions Medication Sig Dispense Refill    loperamide (IMODIUM) 2 mg capsule Take  by mouth.  docusate sodium (COLACE) 100 mg capsule Take 100 mg by mouth two (2) times a day.  diphenhydrAMINE (BENADRYL) 25 mg tablet Take 25 mg by mouth every six (6) hours as needed for Sleep.  peg 400-propylene glycol (SYSTANE, PROPYLENE GLYCOL,) 0.4-0.3 % drop Administer  to left eye as needed.  multivitamin (ONE A DAY) tablet Take 1 Tab by mouth daily.  clonazePAM (KLONOPIN) 0.5 mg tablet Take  by mouth nightly as needed.  meclizine (ANTIVERT) 25 mg tablet Take 1 Tab by mouth three (3) times daily as needed for Dizziness. 20 Tab 0    ondansetron (ZOFRAN ODT) 4 mg disintegrating tablet Take 1 Tab by mouth every eight (8) hours as needed for Nausea. 10 Tab 0    acetaminophen (TYLENOL) 325 mg tablet Take 650 mg by mouth every four (4) hours as needed.  aspirin 81 mg tablet Take 81 mg by mouth daily. Past History     Past Medical History:  Past Medical History:   Diagnosis Date    Cancer ([facility] [address]     vulva    Hypertension     Shoulder fracture, left        Past Surgical History:  Past Surgical History:   Procedure Laterality Date    HX CHOLECYSTECTOMY      HX GYN         Family History:  No family history on file. Social History:  Social History   Substance Use Topics    Smoking status: Never Smoker    Smokeless tobacco: Never Used    Alcohol use No       Allergies: Allergies   Allergen Reactions    Pcn [Penicillins] Hives         Review of Systems   Review of Systems   Constitutional: Negative for activity change, chills and fever. HENT: Negative for congestion and sore throat. Eyes: Negative for pain and redness. Respiratory: Positive for cough (mild dry). Negative for chest tightness and shortness of breath. Cardiovascular: Positive for chest pain (substernal pressure). Negative for palpitations.    Gastrointestinal: Negative for abdominal pain, diarrhea, nausea and vomiting. Genitourinary: Negative for dysuria, frequency and urgency. Musculoskeletal: Positive for gait problem. Negative for back pain and neck pain. Skin: Negative for rash. Neurological: Positive for speech difficulty and weakness (R sided). Negative for dizziness, syncope, light-headedness and headaches. Psychiatric/Behavioral: Negative for confusion. All other systems reviewed and are negative. Physical Exam   Physical Exam   Constitutional: She is oriented to person, place, and time. She appears well-developed and well-nourished. No distress. HENT:   Head: Normocephalic and atraumatic. Nose: Nose normal.   Mouth/Throat: Oropharynx is clear and moist.   Eyes: Conjunctivae and EOM are normal. Pupils are equal, round, and reactive to light. No scleral icterus. Conjunctiva clear bilaterally; Neck: Normal range of motion. Neck supple. No JVD present. No tracheal deviation present. No thyromegaly present. Cardiovascular: Normal rate, regular rhythm and intact distal pulses. Exam reveals no gallop and no friction rub. No murmur heard. Pulmonary/Chest: Effort normal and breath sounds normal. No stridor. No respiratory distress. She has no wheezes. She has no rales. She exhibits no tenderness. Abdominal: Soft. Bowel sounds are normal. She exhibits no distension. There is no tenderness. There is no rebound and no guarding. Musculoskeletal: Normal range of motion. She exhibits no edema or tenderness. Neurological: She is alert and oriented to person, place, and time. She displays normal reflexes. No cranial nerve deficit. She exhibits normal muscle tone.  Coordination normal.   Clear speech; 5/5 strength throughout, although mild weakness in rle compared to left; no past pointing; good heel to shin; negative romberg; holds both arms extended in front of them for 10 seconds without difficulty or drift;  no pronator drift; good equal  strength bilaterally; motor and sensory grossly intact; no facial asymmetry;      Skin: She is not diaphoretic. Nursing note and vitals reviewed. Diagnostic Study Results     Labs -     Recent Results (from the past 12 hour(s))   CBC WITH AUTOMATED DIFF    Collection Time: 12/05/17  3:16 PM   Result Value Ref Range    WBC 7.8 3.6 - 11.0 K/uL    RBC 4.52 3.80 - 5.20 M/uL    HGB 13.2 11.5 - 16.0 g/dL    HCT 39.5 35.0 - 47.0 %    MCV 87.4 80.0 - 99.0 FL    MCH 29.2 26.0 - 34.0 PG    MCHC 33.4 30.0 - 36.5 g/dL    RDW 13.0 11.5 - 14.5 %    PLATELET 239 670 - 989 K/uL    NEUTROPHILS 76 (H) 32 - 75 %    LYMPHOCYTES 15 12 - 49 %    MONOCYTES 8 5 - 13 %    EOSINOPHILS 1 0 - 7 %    BASOPHILS 0 0 - 1 %    ABS. NEUTROPHILS 5.8 1.8 - 8.0 K/UL    ABS. LYMPHOCYTES 1.2 0.8 - 3.5 K/UL    ABS. MONOCYTES 0.6 0.0 - 1.0 K/UL    ABS. EOSINOPHILS 0.1 0.0 - 0.4 K/UL    ABS. BASOPHILS 0.0 0.0 - 0.1 K/UL   METABOLIC PANEL, COMPREHENSIVE    Collection Time: 12/05/17  3:16 PM   Result Value Ref Range    Sodium 142 136 - 145 mmol/L    Potassium 3.5 3.5 - 5.1 mmol/L    Chloride 104 97 - 108 mmol/L    CO2 32 21 - 32 mmol/L    Anion gap 6 5 - 15 mmol/L    Glucose 144 (H) 65 - 100 mg/dL    BUN 13 6 - 20 MG/DL    Creatinine 0.74 0.55 - 1.02 MG/DL    BUN/Creatinine ratio 18 12 - 20      GFR est AA >60 >60 ml/min/1.73m2    GFR est non-AA >60 >60 ml/min/1.73m2    Calcium 8.5 8.5 - 10.1 MG/DL    Bilirubin, total 0.2 0.2 - 1.0 MG/DL    ALT (SGPT) 50 12 - 78 U/L    AST (SGOT) 46 (H) 15 - 37 U/L    Alk.  phosphatase 79 45 - 117 U/L    Protein, total 6.9 6.4 - 8.2 g/dL    Albumin 3.3 (L) 3.5 - 5.0 g/dL    Globulin 3.6 2.0 - 4.0 g/dL    A-G Ratio 0.9 (L) 1.1 - 2.2     TROPONIN I    Collection Time: 12/05/17  3:16 PM   Result Value Ref Range    Troponin-I, Qt. <0.04 <0.05 ng/mL   URINALYSIS W/ REFLEX CULTURE    Collection Time: 12/05/17  4:55 PM   Result Value Ref Range    Color YELLOW/STRAW      Appearance HAZY (A) CLEAR      Specific gravity 1.020 1.003 - 1.030      pH (UA) 6.0 5.0 - 8.0 Protein NEGATIVE  NEG mg/dL    Glucose NEGATIVE  NEG mg/dL    Ketone NEGATIVE  NEG mg/dL    Bilirubin NEGATIVE  NEG      Blood SMALL (A) NEG      Urobilinogen 0.2 0.2 - 1.0 EU/dL    Nitrites NEGATIVE  NEG      Leukocyte Esterase MODERATE (A) NEG      WBC 10-20 0 - 4 /hpf    RBC 5-10 0 - 5 /hpf    Epithelial cells MANY (A) FEW /lpf    Bacteria 2+ (A) NEG /hpf    UA:UC IF INDICATED URINE CULTURE ORDERED (A) CNI      Other: Renal Epithelial cells Present         Radiologic Studies -   CT Results  (Last 48 hours)               12/05/17 1756  CT HEAD WO CONT Final result    Impression:  IMPRESSION: No acute intracranial hemorrhage, mass or infarct. No interval   change. Narrative:  INDICATION: right sided weakness, unsteady gait        Exam: Noncontrast CT of the brain is performed with 5 mm collimation. CT dose reduction was achieved with the use of the standardized protocol   tailored for this examination and automatic exposure control for dose   modulation. Direct comparison is made to prior CT dated November 18, 2017. FINDINGS: There is no acute intracranial hemorrhage, mass, mass effect or   herniation. Ventricular system is normal. There are several stable   periventricular white matter hypodensities, unchanged and consistent with   chronic microvascular ischemic changes. There is no evidence for acute   territorial infarct. The mastoid air cells are well pneumatized. The visualized   paranasal sinuses are normal.           12/05/17 1756  CTA CHEST W OR W WO CONT Final result    Impression:  IMPRESSION: No evidence pulmonary embolism. Narrative:  INDICATION: Intermittent chest pain, onset this morning, shortness of breath,   nausea. CT pulmonary angiogram is performed with 2.5 mm collimation. 100 mL of nonionic   IV Isovue-370 was administered for exam. 3D coronal and sagittal postprocessed   images were performed for this examination.        CT dose reduction was achieved through use of a standardized protocol tailored   for this examination and automatic exposure control for dose modulation. The pulmonary arteries are well opacified and there is no evidence of pulmonary   embolism. The thoracic aorta is normal in course and caliber and there is no   aortic dissection. There is no axillary, mediastinal or hilar lymphadenopathy. The heart is normal in the size and there is no pericardial effusion. The   pleural spaces are normal. There are several bilateral punctate calcified   pulmonary granulomas. No pulmonary nodule or mass is visualized. There is very   mild bibasilar dependent atelectasis. The visualized upper abdominal structures   are normal.               CXR Results  (Last 48 hours)               12/05/17 1530  XR CHEST PORT Final result    Impression:  Impression: No acute process. Narrative: Indication: Intermittent mid chest pain today, shortness of breath, nausea       Comparison: 5/9/2013       Portable exam of the chest obtained at 1506 demonstrates normal heart size. There is no acute process in the lung fields. The osseous structures are   unremarkable. Medical Decision Making   I am the first provider for this patient. I reviewed the vital signs, available nursing notes, past medical history, past surgical history, family history and social history. Vital Signs-Reviewed the patient's vital signs.   Patient Vitals for the past 12 hrs:   Temp Pulse Resp BP SpO2   12/05/17 1900 - 71 15 122/77 99 %   12/05/17 1845 - 77 21 116/63 97 %   12/05/17 1830 - 78 18 (!) 120/93 99 %   12/05/17 1816 - 67 17 - 98 %   12/05/17 1815 - 65 17 129/60 98 %   12/05/17 1814 - 63 12 - 97 %   12/05/17 1813 - 65 14 140/56 98 %   12/05/17 1806 - - - (!) 146/128 -   12/05/17 1731 - 65 15 119/59 95 %   12/05/17 1715 - 64 11 129/66 98 %   12/05/17 1700 - 65 16 113/62 96 %   12/05/17 1645 - 66 14 93/51 95 %   12/05/17 1630 - 68 16 100/64 95 % 12/05/17 1615 - 65 14 110/72 96 %   12/05/17 1600 - 73 20 128/88 98 %   12/05/17 1545 - 70 14 133/66 96 %   12/05/17 1530 - 78 19 153/59 99 %   12/05/17 1515 - 80 12 104/69 99 %   12/05/17 1500 - 79 16 115/62 96 %   12/05/17 1456 98.4 °F (36.9 °C) 77 13 143/66 94 %   12/05/17 1453 - - - 143/66 -       Pulse Oximetry Analysis - 94% on RA    Cardiac Monitor:   Rate: 77 bpm  Rhythm: Normal Sinus Rhythm      ED EKG interpretation: 14:50  Rhythm: normal sinus rhythm; and regular . Rate (approx.): 73; Axis: normal; MT Interval: normal; QRS interval: RBBB; ST/T wave: non-specific changes; This EKG was interpreted by Michel Helms MD,ED Provider. Records Reviewed: Nursing Notes, Old Medical Records and Previous electrocardiograms    Provider Notes (Medical Decision Making):   DDx: ACS, PE, subacute CVA    ED Course:   Initial assessment performed. The patients presenting problems have been discussed, and they are in agreement with the care plan formulated and outlined with them. I have encouraged them to ask questions as they arise throughout their visit. 3:36 PM  I have reviewed medical records in the EHR, pertinent to patients present condition/presenting problems. Seen here on 11/18 with HA and dizziness and unsteady gait requiring assistance to ambulate. Had a CT head w/o contrast which showed no acute intracranial abnormality. Michel Helms MD      Disposition:  DISCHARGE NOTE:  7:17 PM  Pt has been reexamined. Pt has no new complaints, changes, or physical findings. CT head without acute abnormality; negative troponin; no new deficits since multiple prior visits; already on asa and already has seen neurology for same sx; pt agreed to follow up for mri as scheduled; gave neurology and cardiology follow up information at this location as well; Care plan outlined and precautions discussed. All available results reviewed with pt. All medications reviewed with pt.  All of pts questions and concerns addressed. Pt agrees to f/u as instructed and agrees to return to ED upon further deterioration. Pt is ready to go home. Written by Pamela Panda ED Scribe as dictated by Hilda Stanley MD    PLAN:  1. Current Discharge Medication List        2. Follow-up Information     Follow up With Details Comments Joshua Moreira MD Schedule an appointment as soon as possible for a visit in 1 week  [address] [facility] [address] [phone]      Mulugeta Mcpherson MD Schedule an appointment as soon as possible for a visit in 1 week  [address] [facility]  [phone]          Return to ED if worse     Diagnosis     Clinical Impression:   1. Atypical chest pain        Attestations: This note is prepared by Pamela Panda acting as scribe for MD Hilda Orellana MD : The scribe's documentation has been prepared under my direction and personally reviewed by me in its entirety. I confirm that the note above accurately reflects all work, treatment, procedures, and medical decision making performed by me.

## 2017-12-05 NOTE — DISCHARGE INSTRUCTIONS
Chest Pain: Care Instructions  Your Care Instructions    There are many things that can cause chest pain. Some are not serious and will get better on their own in a few days. But some kinds of chest pain need more testing and treatment. Your doctor may have recommended a follow-up visit in the next 8 to 12 hours. If you are not getting better, you may need more tests or treatment. Even though your doctor has released you, you still need to watch for any problems. The doctor carefully checked you, but sometimes problems can develop later. If you have new symptoms or if your symptoms do not get better, get medical care right away. If you have worse or different chest pain or pressure that lasts more than 5 minutes or you passed out (lost consciousness), call 911 or seek other emergency help right away. A medical visit is only one step in your treatment. Even if you feel better, you still need to do what your doctor recommends, such as going to all suggested follow-up appointments and taking medicines exactly as directed. This will help you recover and help prevent future problems. How can you care for yourself at home? · Rest until you feel better. · Take your medicine exactly as prescribed. Call your doctor if you think you are having a problem with your medicine. · Do not drive after taking a prescription pain medicine. When should you call for help? Call 911 if:  ? · You passed out (lost consciousness). ? · You have severe difficulty breathing. ? · You have symptoms of a heart attack. These may include:  ¨ Chest pain or pressure, or a strange feeling in your chest.  ¨ Sweating. ¨ Shortness of breath. ¨ Nausea or vomiting. ¨ Pain, pressure, or a strange feeling in your back, neck, jaw, or upper belly or in one or both shoulders or arms. ¨ Lightheadedness or sudden weakness. ¨ A fast or irregular heartbeat.   After you call 911, the  may tell you to chew 1 adult-strength or 2 to 4 low-dose aspirin. Wait for an ambulance. Do not try to drive yourself. ?Call your doctor today if:  ? · You have any trouble breathing. ? · Your chest pain gets worse. ? · You are dizzy or lightheaded, or you feel like you may faint. ? · You are not getting better as expected. ? · You are having new or different chest pain. Where can you learn more? Go to http://huber-selvin.info/. Enter A120 in the search box to learn more about \"Chest Pain: Care Instructions. \"  Current as of: March 20, 2017  Content Version: 11.4  © 5739-9644 [employer]. Care instructions adapted under license by [employer] (which disclaims liability or warranty for this information). If you have questions about a medical condition or this instruction, always ask your healthcare professional. [address] any warranty or liability for your use of this information.

## 2017-12-06 NOTE — ED NOTES
Gustavo Weston MD reviewed discharge instructions with the patient. The patient verbalized understanding. Wheelchair  on discharge.

## 2017-12-07 LAB
BACTERIA SPEC CULT: NORMAL
CC UR VC: NORMAL
SERVICE CMNT-IMP: NORMAL

## 2018-02-06 ENCOUNTER — OFFICE VISIT (OUTPATIENT)
Dept: NEUROLOGY | Age: 73
End: 2018-02-06

## 2018-02-06 ENCOUNTER — TELEPHONE (OUTPATIENT)
Dept: NEUROLOGY | Age: 73
End: 2018-02-06

## 2018-02-06 VITALS
HEART RATE: 82 BPM | SYSTOLIC BLOOD PRESSURE: 120 MMHG | BODY MASS INDEX: 26.87 KG/M2 | HEIGHT: 62 IN | DIASTOLIC BLOOD PRESSURE: 70 MMHG | OXYGEN SATURATION: 98 % | WEIGHT: 146 LBS

## 2018-02-06 DIAGNOSIS — H81.4 VERTIGO OF CENTRAL ORIGIN, UNSPECIFIED LATERALITY: ICD-10-CM

## 2018-02-06 DIAGNOSIS — I63.9 CEREBROVASCULAR ACCIDENT (CVA), UNSPECIFIED MECHANISM (HCC): Primary | ICD-10-CM

## 2018-02-06 NOTE — LETTER
2/6/2018 12:24 PM 
 
Patient:  Ana Hi YOB: 1945 Date of Visit: 2/6/2018 Dear Sandeep Chan MD 
[address] [place] [address] [address] [place] [address] VIA Facsimile: [phone] 
 : Thank you for referring Ms. Timothy Thurman to me for evaluation/treatment. Below are the relevant portions of my assessment and plan of care. HISTORY OF PRESENT ILLNESS Ana Hi is a 81 y.o. female. HPI Comments: Ana Hi is a 79-year-old right-handed  female who comes in today for a \"mini stroke. She says that on December 7 of 2017 she developed right sided weakness. She apparently waited a day or 2 and then went to [facility].  She had a workup which included at least an MRI scan of her brain which was read as normal.  It was not done with contrast.  She states she subsequently had an echocardiogram and carotid Dopplers done at some physician's outpatient office. Those were done recently and she has not had results. He is taking aspirin 81 mg a day. She is here with her son today. She has had chronic vertigo in the past and takes meclizine on a as needed basis. She has anxiety and takes clonazepam for that. She feels that her right side still feels very slightly weak. Neurologic Problem The history is provided by the relative (Son). This is a chronic problem. Review of Systems Constitutional:  
     Positive for anxiety, constipation, cough, depression, diarrhea, fainting, fatigue, hearing loss, leg swelling, memory loss mild, muscle pain, muscle weakness, tinnitus, shortness of breath, skipped cardiac beats, snoring, stomach pain, and weight change, increased a few pounds. Complete review of systems done all others negative. Current Outpatient Prescriptions on File Prior to Visit Medication Sig Dispense Refill  docusate sodium (COLACE) 100 mg capsule Take 100 mg by mouth two (2) times a day.  multivitamin (ONE A DAY) tablet Take 1 Tab by mouth daily.  clonazePAM (KLONOPIN) 0.5 mg tablet Take  by mouth nightly as needed.  meclizine (ANTIVERT) 25 mg tablet Take 1 Tab by mouth three (3) times daily as needed for Dizziness. 20 Tab 0  
 ondansetron (ZOFRAN ODT) 4 mg disintegrating tablet Take 1 Tab by mouth every eight (8) hours as needed for Nausea. 10 Tab 0  
 aspirin 81 mg tablet Take 81 mg by mouth daily.  loperamide (IMODIUM) 2 mg capsule Take  by mouth.  diphenhydrAMINE (BENADRYL) 25 mg tablet Take 25 mg by mouth every six (6) hours as needed for Sleep.  peg 400-propylene glycol (SYSTANE, PROPYLENE GLYCOL,) 0.4-0.3 % drop Administer  to left eye as needed.  acetaminophen (TYLENOL) 325 mg tablet Take 650 mg by mouth every four (4) hours as needed. No current facility-administered medications on file prior to visit. Past Medical History:  
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 No pertinent family history. Social History Substance Use Topics  Smoking status: Never Smoker  Smokeless tobacco: Never Used  Alcohol use No  
 
/70  Pulse 82  Ht 5' 2\" (1.575 m)  Wt 146 lb (66.2 kg)  SpO2 98%  BMI 26.7 kg/m2 Physical Exam 
Constitutional: Oriented to person, place, and time, appears well-developed and well-nourished. No distress. HENT:  
Head: Normocephalic and atraumatic. Mouth/Throat: Oropharynx is clear and moist. No oropharyngeal exudate. Eyes: Conjunctivae and EOM are normal. Pupils are equal, round, and reactive to light. No scleral icterus. Neck: Normal range of motion. Neck supple. No thyromegaly present. Cardiovascular: Normal rate, regular rhythm and normal heart sounds. Musculoskeletal: Normal range of motion, exhibits no edema, tenderness or deformity. Lymphadenopathy: no cervical adenopathy. Neurological: Alert and oriented to person, place, and time. Normal strength and normal reflexes. Displays no atrophy and no tremor. No cranial nerve deficit or sensory deficit. Exhibits normal muscle tone. Displays a negative Romberg sign, no seizure activity. Coordination normal, gait normal.  
No Babinski's sign on the right side. No Babinski's sign on the left side. Speech, language and mentation are normal 
Visual fields are full to confrontation, funduscopic exam reveals flat discs, the retina and vasculature are normal  
Skin: Skin is warm and dry. No rash noted, not diaphoretic. No erythema. Psychiatric: Normal mood and affect,  behavior is normal. Judgment and thought content normal.  
Vitals reviewed. ASSESSMENT and PLAN 
HISTORY OF RIGHT SIDED WEAKNESS I cannot  any abnormality on exam and her MRI scan done at [facility] was normal.  I will nonetheless proceed with a repeat MRI scan with gadolinium contrast here at [facility]. I will see if I see any abnormality on it. I will attempt to obtain the results of her echocardiogram and her carotid Dopplers. She is on aspirin 81 mg a day, I recommend she stay on that. She will call me after her results are in and I will discuss them with her on the telephone. I will plan to see her back in 6 months. This note will not be viewable in [address] This note was created using voice recognition software. Despite editing, there may be syntax errors. If you have questions, please do not hesitate to call me. I look forward to following Ms. Kyung Armando along with you. Sincerely, Andrew Walton MD

## 2018-02-06 NOTE — PATIENT INSTRUCTIONS
[facility]   Statement to Patients  April 1, 2014      In an effort to ensure the large volume of patient prescription refills is processed in the most efficient and expeditious manner, we are asking our patients to assist us by calling your Pharmacy for all prescription refills, this will include also your  Mail Order Pharmacy. The pharmacy will contact our office electronically to continue the refill process. Please do not wait until the last minute to call your pharmacy. We need at least 48 hours (2days) to fill prescriptions. We also encourage you to call your pharmacy before going to  your prescription to make sure it is ready. With regard to controlled substance prescription refill requests (narcotic refills) that need to be picked up at our office, we ask your cooperation by providing us with at least 72 hours (3days) notice that you will need a refill. We will not refill narcotic prescription refill requests after 4:00pm on any weekday, Monday through Thursday, or after 2:00pm on Fridays, or on the weekends. We encourage everyone to explore another way of getting your prescription refill request processed using [employer], our patient web portal through our electronic medical record system. [employer] is an efficient and effective way to communicate your medication request directly to the office and  downloadable as an ben on your smart phone . [employer] also features a review functionality that allows you to view your medication list as well as leave messages for your physician. Are you ready to get connected? If so please review the attatched instructions or speak to any of our staff to get you set up right away! Thank you so much for your cooperation. Should you have any questions please contact our Practice Administrator.     The Physicians and Staff,  [facility]

## 2018-02-06 NOTE — PROGRESS NOTES
HISTORY OF PRESENT ILLNESS  Malgorzata Rose is a 81 y.o. female. HPI Comments: Malgorzata Rose is a 68-year-old right-handed  female who comes in today for a \"mini stroke. She says that on December 7 of 2017 she developed right sided weakness. She apparently waited a day or 2 and then went to [facility].  She had a workup which included at least an MRI scan of her brain which was read as normal.  It was not done with contrast.  She states she subsequently had an echocardiogram and carotid Dopplers done at some physician's outpatient office. Those were done recently and she has not had results. He is taking aspirin 81 mg a day. She is here with her son today. She has had chronic vertigo in the past and takes meclizine on a as needed basis. She has anxiety and takes clonazepam for that. She feels that her right side still feels very slightly weak. Neurologic Problem   The history is provided by the relative (Son). This is a chronic problem. Review of Systems   Constitutional:        Positive for anxiety, constipation, cough, depression, diarrhea, fainting, fatigue, hearing loss, leg swelling, memory loss mild, muscle pain, muscle weakness, tinnitus, shortness of breath, skipped cardiac beats, snoring, stomach pain, and weight change, increased a few pounds. Complete review of systems done all others negative. Current Outpatient Prescriptions on File Prior to Visit   Medication Sig Dispense Refill    docusate sodium (COLACE) 100 mg capsule Take 100 mg by mouth two (2) times a day.  multivitamin (ONE A DAY) tablet Take 1 Tab by mouth daily.  clonazePAM (KLONOPIN) 0.5 mg tablet Take  by mouth nightly as needed.  meclizine (ANTIVERT) 25 mg tablet Take 1 Tab by mouth three (3) times daily as needed for Dizziness. 20 Tab 0    ondansetron (ZOFRAN ODT) 4 mg disintegrating tablet Take 1 Tab by mouth every eight (8) hours as needed for Nausea.  10 Tab 0    aspirin 81 mg tablet Take 81 mg by mouth daily.  loperamide (IMODIUM) 2 mg capsule Take  by mouth.  diphenhydrAMINE (BENADRYL) 25 mg tablet Take 25 mg by mouth every six (6) hours as needed for Sleep.  peg 400-propylene glycol (SYSTANE, PROPYLENE GLYCOL,) 0.4-0.3 % drop Administer  to left eye as needed.  acetaminophen (TYLENOL) 325 mg tablet Take 650 mg by mouth every four (4) hours as needed. No current facility-administered medications on file prior to visit. Past Medical History:   Diagnosis Date    Arthritis     Cancer ([facility]     vulva    Cerebral artery occlusion with cerebral infarction ([facility]     Hypertension     Shoulder fracture, left      History reviewed. No pertinent family history. Social History   Substance Use Topics    Smoking status: Never Smoker    Smokeless tobacco: Never Used    Alcohol use No     /70  Pulse 82  Ht 5' 2\" (1.575 m)  Wt 146 lb (66.2 kg)  SpO2 98%  BMI 26.7 kg/m2  Physical Exam  Constitutional: Oriented to person, place, and time, appears well-developed and well-nourished. No distress. HENT:   Head: Normocephalic and atraumatic. Mouth/Throat: Oropharynx is clear and moist. No oropharyngeal exudate. Eyes: Conjunctivae and EOM are normal. Pupils are equal, round, and reactive to light. No scleral icterus. Neck: Normal range of motion. Neck supple. No thyromegaly present. Cardiovascular: Normal rate, regular rhythm and normal heart sounds. Musculoskeletal: Normal range of motion, exhibits no edema, tenderness or deformity. Lymphadenopathy: no cervical adenopathy. Neurological: Alert and oriented to person, place, and time. Normal strength and normal reflexes. Displays no atrophy and no tremor. No cranial nerve deficit or sensory deficit. Exhibits normal muscle tone. Displays a negative Romberg sign, no seizure activity. Coordination normal, gait normal.   No Babinski's sign on the right side.  No Babinski's sign on the left side. Speech, language and mentation are normal  Visual fields are full to confrontation, funduscopic exam reveals flat discs, the retina and vasculature are normal   Skin: Skin is warm and dry. No rash noted, not diaphoretic. No erythema. Psychiatric: Normal mood and affect,  behavior is normal. Judgment and thought content normal.   Vitals reviewed. ASSESSMENT and PLAN  HISTORY OF RIGHT SIDED WEAKNESS  I cannot  any abnormality on exam and her MRI scan done at [facility] was normal.  I will nonetheless proceed with a repeat MRI scan with gadolinium contrast here at [facility]. I will see if I see any abnormality on it. I will attempt to obtain the results of her echocardiogram and her carotid Dopplers. She is on aspirin 81 mg a day, I recommend she stay on that. She will call me after her results are in and I will discuss them with her on the telephone. I will plan to see her back in 6 months. This note will not be viewable in [address] This note was created using voice recognition software. Despite editing, there may be syntax errors.

## 2018-02-06 NOTE — MR AVS SNAPSHOT
[address], 
[id number], [address] [facility] 
[phone] Patient: Janeth Feliz MRN: [id number] HBD:1/50/0565 Visit Information Date & Time Provider Department Dept. Phone Encounter #  
 2/6/2018 11:00 AM Viji Arriaza MD [facility]rology Clinic at [facility] [phone] [id number] Follow-up Instructions Return in about 6 months (around 8/6/2018). Upcoming Health Maintenance Date Due Hepatitis C Screening 1945 DTaP/Tdap/Td series (1 - Tdap) 6/13/1968 BREAST CANCER SCRN MAMMOGRAM 6/13/1997 FOBT Q 1 YEAR AGE 50-75 6/13/1997 ZOSTER VACCINE AGE 60> 4/13/2007 GLAUCOMA SCREENING Q2Y 6/13/2012 OSTEOPOROSIS SCREENING (DEXA) 6/13/2012 Pneumococcal 65+ Low/Medium Risk (1 of 2 - PCV13) 6/13/2012 MEDICARE YEARLY EXAM 6/13/2012 Allergies as of 2/6/2018  Review Complete On: 2/6/2018 By: Darshana Stewart LPN Severity Noted Reaction Type Reactions Pcn [Penicillins] Medium 03/10/2011   Side Effect Hives Current Immunizations  Never Reviewed No immunizations on file. Not reviewed this visit You Were Diagnosed With   
  
 Codes Comments Cerebrovascular accident (CVA), unspecified mechanism ([facility]    -  Primary ICD-10-CM: I63.9 ICD-9-CM: 434.91 Vitals BP Pulse Height(growth percentile) Weight(growth percentile) SpO2 BMI  
 120/70 82 5' 2\" (1.575 m) 146 lb (66.2 kg) 98% 26.7 kg/m2 OB Status Smoking Status Hysterectomy Never Smoker Vitals History BMI and BSA Data Body Mass Index Body Surface Area  
 26.7 kg/m 2 1.7 m 2 Preferred Pharmacy Pharmacy Name Phone [facility] [phone] [place] [address] [phone] Your Updated Medication List  
  
   
This list is accurate as of: 2/6/18 11:19 AM.  Always use your most recent med list.  
  
  
  
  
 aspirin 81 mg tablet Take 81 mg by mouth daily. clonazePAM 0.5 mg tablet Commonly known as:  Loretta Bimler Take  by mouth nightly as needed. diphenhydrAMINE 25 mg tablet Commonly known as:  BENADRYL Take 25 mg by mouth every six (6) hours as needed for Sleep.  
  
 docusate sodium 100 mg capsule Commonly known as:  Marla Peeling Take 100 mg by mouth two (2) times a day. loperamide 2 mg capsule Commonly known as:  IMODIUM Take  by mouth.  
  
 meclizine 25 mg tablet Commonly known as:  ANTIVERT Take 1 Tab by mouth three (3) times daily as needed for Dizziness. multivitamin tablet Commonly known as:  ONE A DAY Take 1 Tab by mouth daily. ondansetron 4 mg disintegrating tablet Commonly known as:  ZOFRAN ODT Take 1 Tab by mouth every eight (8) hours as needed for Nausea. SYSTANE (PROPYLENE GLYCOL) 0.4-0.3 % Drop Generic drug:  peg 400-propylene glycol Administer  to left eye as needed. TYLENOL 325 mg tablet Generic drug:  acetaminophen Take 650 mg by mouth every four (4) hours as needed. Follow-up Instructions Return in about 6 months (around 8/6/2018). To-Do List   
 02/23/2018 Imaging:  MRA NECK W CONT   
  
 02/23/2018 Imaging:  MRI BRAIN W WO CONT Patient Instructions PRESCRIPTION REFILL POLICY [facility] Statement to Patients April 1, 2014 In an effort to ensure the large volume of patient prescription refills is processed in the most efficient and expeditious manner, we are asking our patients to assist us by calling your Pharmacy for all prescription refills, this will include also your  Mail Order Pharmacy. The pharmacy will contact our office electronically to continue the refill process. Please do not wait until the last minute to call your pharmacy. We need at least 48 hours (2days) to fill prescriptions.  We also encourage you to call your pharmacy before going to  your prescription to make sure it is ready. With regard to controlled substance prescription refill requests (narcotic refills) that need to be picked up at our office, we ask your cooperation by providing us with at least 72 hours (3days) notice that you will need a refill. We will not refill narcotic prescription refill requests after 4:00pm on any weekday, Monday through Thursday, or after 2:00pm on Fridays, or on the weekends. We encourage everyone to explore another way of getting your prescription refill request processed using [employer], our patient web portal through our electronic medical record system. [employer] is an efficient and effective way to communicate your medication request directly to the office and  downloadable as an ben on your smart phone . [employer] also features a review functionality that allows you to view your medication list as well as leave messages for your physician. Are you ready to get connected? If so please review the attatched instructions or speak to any of our staff to get you set up right away! Thank you so much for your cooperation. Should you have any questions please contact our Practice Administrator. The Physicians and Staff,  [facility] Introducing [facility] SERVICES! [facility] introduces [employer] patient portal. Now you can access parts of your medical record, email your doctor's office, and request medication refills online. 1. In your internet browser, go to [email or web address] 2. Click on the First Time User? Click Here link in the Sign In box. You will see the New Member Sign Up page. 3. Enter your [employer] Access Code exactly as it appears below. You will not need to use this code after youve completed the sign-up process. If you do not sign up before the expiration date, you must request a new code. · [employer] Access Code: [email or web address] Expires: 2/16/2018  6:02 PM 
 
4. Enter the last four digits of your Social Security Number (xxxx) and Date of Birth (mm/dd/yyyy) as indicated and click Submit. You will be taken to the next sign-up page. 5. Create a [employer] ID. This will be your [employer] login ID and cannot be changed, so think of one that is secure and easy to remember. 6. Create a [employer] password. You can change your password at any time. 7. Enter your Password Reset Question and Answer. This can be used at a later time if you forget your password. 8. Enter your e-mail address. You will receive e-mail notification when new information is available in [address] 9. Click Sign Up. You can now view and download portions of your medical record. 10. Click the Download Summary menu link to download a portable copy of your medical information. If you have questions, please visit the Frequently Asked Questions section of the [employer] website. Remember, [employer] is NOT to be used for urgent needs. For medical emergencies, dial 911. Now available from your iPhone and Android! Please provide this summary of care documentation to your next provider. Your primary care clinician is listed as Josue Nissen. If you have any questions after today's visit, please call [phone].

## 2018-02-17 ENCOUNTER — HOSPITAL ENCOUNTER (OUTPATIENT)
Dept: MRI IMAGING | Age: 73
Discharge: HOME | End: 2018-02-17
Attending: PSYCHIATRY & NEUROLOGY
Payer: MEDICARE

## 2018-02-17 DIAGNOSIS — I63.9 CEREBROVASCULAR ACCIDENT (CVA), UNSPECIFIED MECHANISM (HCC): ICD-10-CM

## 2018-02-17 PROCEDURE — 70547 MR ANGIOGRAPHY NECK W/O DYE: CPT

## 2018-02-17 PROCEDURE — 70551 MRI BRAIN STEM W/O DYE: CPT

## 2018-04-09 DIAGNOSIS — G44.209 ACUTE NON INTRACTABLE TENSION-TYPE HEADACHE: Primary | ICD-10-CM

## 2018-04-09 RX ORDER — BUTALBITAL, ACETAMINOPHEN AND CAFFEINE 50; 325; 40 MG/1; MG/1; MG/1
1 TABLET ORAL
Qty: 40 TAB | Refills: 0 | Status: SHIPPED | OUTPATIENT
Start: 2018-04-09 | End: 2020-04-21 | Stop reason: ALTCHOICE

## 2018-04-30 VITALS
RESPIRATION RATE: 18 BRPM | HEART RATE: 75 BPM | OXYGEN SATURATION: 100 % | WEIGHT: 145.06 LBS | BODY MASS INDEX: 27.39 KG/M2 | TEMPERATURE: 97.5 F | DIASTOLIC BLOOD PRESSURE: 84 MMHG | SYSTOLIC BLOOD PRESSURE: 175 MMHG | HEIGHT: 61 IN

## 2018-04-30 PROCEDURE — 99283 EMERGENCY DEPT VISIT LOW MDM: CPT

## 2018-05-01 ENCOUNTER — HOSPITAL ENCOUNTER (EMERGENCY)
Age: 73
Discharge: HOME | End: 2018-05-01
Attending: EMERGENCY MEDICINE
Payer: MEDICARE

## 2018-05-01 ENCOUNTER — APPOINTMENT (OUTPATIENT)
Dept: ULTRASOUND IMAGING | Age: 73
End: 2018-05-01
Attending: PHYSICIAN ASSISTANT
Payer: MEDICARE

## 2018-05-01 ENCOUNTER — APPOINTMENT (OUTPATIENT)
Dept: GENERAL RADIOLOGY | Age: 73
End: 2018-05-01
Attending: EMERGENCY MEDICINE
Payer: MEDICARE

## 2018-05-01 DIAGNOSIS — M25.551 PAIN OF RIGHT HIP JOINT: Primary | ICD-10-CM

## 2018-05-01 PROCEDURE — 73502 X-RAY EXAM HIP UNI 2-3 VIEWS: CPT

## 2018-05-01 PROCEDURE — 74011250637 HC RX REV CODE- 250/637: Performed by: EMERGENCY MEDICINE

## 2018-05-01 PROCEDURE — 93971 EXTREMITY STUDY: CPT

## 2018-05-01 RX ORDER — LIDOCAINE 50 MG/G
PATCH TOPICAL
Qty: 5 EACH | Refills: 0 | Status: SHIPPED | OUTPATIENT
Start: 2018-05-01 | End: 2020-04-21 | Stop reason: ALTCHOICE

## 2018-05-01 RX ORDER — LIDOCAINE 50 MG/G
1 PATCH TOPICAL
Status: DISCONTINUED | OUTPATIENT
Start: 2018-05-01 | End: 2018-05-01 | Stop reason: HOSPADM

## 2018-05-01 RX ORDER — NAPROXEN 500 MG/1
500 TABLET ORAL
Qty: 20 TAB | Refills: 0 | Status: SHIPPED | OUTPATIENT
Start: 2018-05-01 | End: 2020-10-20

## 2018-05-01 NOTE — ED PROVIDER NOTES
EMERGENCY DEPARTMENT HISTORY AND PHYSICAL EXAM    Date: 5/1/2018  Patient Name: Pako Fuentes    History of Presenting Illness     Chief Complaint   Patient presents with    Leg Pain     ambulatory into triage with personal cane, pt complains of intermittent RIGHT leg pain radiating down leg, pt reports \"I think I pulled a muscle\"; pt denies injury, trauma, or fall, onset 9am, pt reports she took 1 ASA with minimal relief       History Provided By: Patient    HPI: Pako Fuentes is a 81 y.o. female, pmhx Arthritis, sciatica, who presents ambulatory to the ED c/o intermittent, sharp shooting right groin pain and persistent, aching right hip pain x5 days. Pt states the pain radiates down her leg and feels similar to past hx of sciatica. Pt notes that prior to the onset of her pain x5 days ago, she had jumped out of her truck to prevent her grandchildren from fighting. Pt states when turning to reach in, she had the onset of pain. Pt states she was unable to bear weight on her leg this morning secondary to the pain, prompting her visit to the ED. Pt notes the pain is exacerbated with pressure and walking on the leg. She denies taking any medications to modify her pain. Pt denies the use of a cane or walker to ambulate at baseline. Pt specifically denies any fever, congestion, cough, shortness of breath, chest pain, abdominal pain, nausea, vomiting, diarrhea, dysuria, or urinary frequency. PCP: Wally Bruner MD   Cardiology: Lore Strong MD    PMHx: Significant for Arthritis, sciatica, HTN, Cancer  PSHx: Significant for cholecystectomy  Social Hx: -tobacco, -EtOH, -Illicit Drugs     There are no other complaints, changes, or physical findings at this time.      Current Facility-Administered Medications   Medication Dose Route Frequency Provider Last Rate Last Dose    lidocaine (LIDODERM) 5 % patch 1 Patch  1 Patch TransDERmal NOW Diana Pierre MD         Current Outpatient Prescriptions   Medication Sig Dispense Refill    lidocaine (LIDODERM) 5 % Apply patch to the affected area for 12 hours a day and remove for 12 hours a day. 5 Each 0    naproxen (NAPROSYN) 500 mg tablet Take 1 Tab by mouth every twelve (12) hours as needed for Pain. 20 Tab 0    butalbital-acetaminophen-caffeine (FIORICET, ESGIC) -40 mg per tablet Take 1 Tab by mouth every six (6) hours as needed for Pain. This medication is only to be filled in one month intervals 40 Tab 0    loperamide (IMODIUM) 2 mg capsule Take  by mouth.  docusate sodium (COLACE) 100 mg capsule Take 100 mg by mouth two (2) times a day.  peg 400-propylene glycol (SYSTANE, PROPYLENE GLYCOL,) 0.4-0.3 % drop Administer  to left eye as needed.  multivitamin (ONE A DAY) tablet Take 1 Tab by mouth daily.  clonazePAM (KLONOPIN) 0.5 mg tablet Take  by mouth nightly as needed.  meclizine (ANTIVERT) 25 mg tablet Take 1 Tab by mouth three (3) times daily as needed for Dizziness. 20 Tab 0    ondansetron (ZOFRAN ODT) 4 mg disintegrating tablet Take 1 Tab by mouth every eight (8) hours as needed for Nausea. 10 Tab 0    acetaminophen (TYLENOL) 325 mg tablet Take 650 mg by mouth every four (4) hours as needed.  aspirin 81 mg tablet Take 81 mg by mouth daily. Past History     Past Medical History:  Past Medical History:   Diagnosis Date    Arthritis     Cancer ([facility] [address]     vulva    Cerebral artery occlusion with cerebral infarction ([facility] [address]     Hypertension     Shoulder fracture, left        Past Surgical History:  Past Surgical History:   Procedure Laterality Date    HX CHOLECYSTECTOMY      HX GYN         Family History:  History reviewed. No pertinent family history. Social History:  Social History   Substance Use Topics    Smoking status: Never Smoker    Smokeless tobacco: Never Used    Alcohol use No       Allergies:   Allergies   Allergen Reactions    Pcn [Penicillins] Hives         Review of Systems   Review of Systems   Constitutional: Negative. Negative for fever. Eyes: Negative. Respiratory: Negative. Negative for shortness of breath. Cardiovascular: Negative for chest pain. Gastrointestinal: Negative for abdominal pain, nausea and vomiting. Endocrine: Negative. Genitourinary: Negative. Negative for difficulty urinating, dysuria and hematuria. Musculoskeletal: Positive for myalgias (R groin and hip). Skin: Negative. Allergic/Immunologic: Negative. Neurological: Negative. Psychiatric/Behavioral: Negative for suicidal ideas. All other systems reviewed and are negative. Physical Exam   Physical Exam   Constitutional: She is oriented to person, place, and time. She appears well-developed and well-nourished. No distress. HENT:   Head: Normocephalic and atraumatic. Nose: Nose normal.   Eyes: Conjunctivae and EOM are normal. No scleral icterus. Neck: Normal range of motion. No tracheal deviation present. Cardiovascular: Normal rate, regular rhythm, normal heart sounds and intact distal pulses. Exam reveals no friction rub. No murmur heard. Pulmonary/Chest: Effort normal and breath sounds normal. No stridor. No respiratory distress. She has no wheezes. She has no rales. Abdominal: Soft. Bowel sounds are normal. She exhibits no distension. There is no tenderness. There is no rebound. Musculoskeletal: Normal range of motion. She exhibits tenderness. She exhibits no deformity. Right hip: She exhibits tenderness and bony tenderness. She exhibits no swelling, no crepitus and no deformity. Legs:  Neurological: She is alert and oriented to person, place, and time. No cranial nerve deficit. Skin: Skin is warm and dry. No rash noted. She is not diaphoretic. Psychiatric: She has a normal mood and affect. Her speech is normal and behavior is normal. Judgment and thought content normal. Cognition and memory are normal.   Nursing note and vitals reviewed.         Diagnostic Study Results Labs -   No results found for this or any previous visit (from the past 12 hour(s)). Radiologic Studies -   XR HIP RT W OR WO PELV 2-3 VWS   Final Result      DUPLEX LOWER EXT VENOUS RIGHT   Final Result        CT Results  (Last 48 hours)    None        CXR Results  (Last 48 hours)    None            Medical Decision Making   I am the first provider for this patient. I reviewed the vital signs, available nursing notes, past medical history, past surgical history, family history and social history. Vital Signs-Reviewed the patient's vital signs. Patient Vitals for the past 12 hrs:   Temp Pulse Resp BP SpO2   04/30/18 2118 97.5 °F (36.4 °C) 75 18 175/84 100 %       Pulse Oximetry Analysis - 100% on RA    Cardiac Monitor:   Rate: 75 bpm  Rhythm: Normal Sinus Rhythm      Records Reviewed: Nursing Notes and Old Medical Records    Provider Notes (Medical Decision Making):     DDX:  Dvt, fracture, contusion, strain    Plan:  dvt study, hip xrray    Impression:  Acute R hip pain    ED Course:   Initial assessment performed. The patients presenting problems have been discussed, and they are in agreement with the care plan formulated and outlined with them. I have encouraged them to ask questions as they arise throughout their visit. I reviewed our electronic medical record system for any past medical records that were available that may contribute to the patients current condition, the nursing notes and and vital signs from today's visit    Nursing notes will be reviewed as they become available in realtime while the pt has been in the ED. Clara Jiang MD      I personally reviewed pt's imaging. Official read by radiology listed above. Clara Jiang MD      4:11 AM  Progress note:  Pt noted to be feeling better, ready for discharge. Discussed imaging findings with pt, specifically noting negative x-ray and duplex study. Pt will follow up as instructed.  All questions have been answered, pt voiced understanding and agreement with plan. If narcotics were prescribed, pt was advised not to drive or operate heavy machinery. If abx were prescribed, pt advised that diarrhea and rash are possible side effects of the medications. Specific return precautions provided in addition to instructions for pt to return to the ED immediately should sx worsen at any time. Darline Simon MD      Critical Care Time:     None    PLAN:  1. Current Discharge Medication List      START taking these medications    Details   lidocaine (LIDODERM) 5 % Apply patch to the affected area for 12 hours a day and remove for 12 hours a day. Qty: 5 Each, Refills: 0      naproxen (NAPROSYN) 500 mg tablet Take 1 Tab by mouth every twelve (12) hours as needed for Pain. Qty: 20 Tab, Refills: 0           2. Follow-up Information     Follow up With Details Marlon Prater MD In 2 days  [address] [place] [address]  [phone]      Adry Weeks MD Schedule an appointment as soon as possible for a visit As needed . [address] [place] [address]  [address]  [phone]          Return to ED if worse     Disposition:    4:10 AM   The patient's results have been reviewed with family and/or caregiver. They verbally convey their understanding and agreement of the patient's signs, symptoms, diagnosis, treatment and prognosis and additionally agree to follow up as recommended in the discharge instructions or to return to the Emergency Room should the patient's condition change prior to their follow-up appointment. The family and/or caregiver verbally agrees with the care-plan and all of their questions have been answered.  The discharge instructions have also been provided to the them with educational information regarding the patient's diagnosis as well a list of reasons why the patient would want to return to the ER prior to their follow-up appointment should their condition change. Debra Agiular MD        Diagnosis     Clinical Impression:   1. Pain of right hip joint        Attestations: This note is prepared by Jon Avila, acting as Scribe for MD Debra Mchugh MD : The scribe's documentation has been prepared under my direction and personally reviewed by me in its entirety. I confirm that the note above accurately reflects all work, treatment, procedures, and medical decision making performed by me. This note will not be viewable in [address]

## 2018-05-01 NOTE — DISCHARGE INSTRUCTIONS
Hip Pain: Care Instructions  Your Care Instructions    Hip pain may be caused by many things, including overuse, a fall, or a twisting movement. Another cause of hip pain is arthritis. Your pain may increase when you stand up, walk, or squat. The pain may come and go or may be constant. Home treatment can help relieve hip pain, swelling, and stiffness. If your pain is ongoing, you may need more tests and treatment. Follow-up care is a key part of your treatment and safety. Be sure to make and go to all appointments, and call your doctor if you are having problems. It's also a good idea to know your test results and keep a list of the medicines you take. How can you care for yourself at home? · Take pain medicines exactly as directed. ¨ If the doctor gave you a prescription medicine for pain, take it as prescribed. ¨ If you are not taking a prescription pain medicine, ask your doctor if you can take an over-the-counter medicine. · Rest and protect your hip. Take a break from any activity, including standing or walking, that may cause pain. · Put ice or a cold pack against your hip for 10 to 20 minutes at a time. Try to do this every 1 to 2 hours for the next 3 days (when you are awake) or until the swelling goes down. Put a thin cloth between the ice and your skin. · Sleep on your healthy side with a pillow between your knees, or sleep on your back with pillows under your knees. · If there is no swelling, you can put moist heat, a heating pad, or a warm cloth on your hip. Do gentle stretching exercises to help keep your hip flexible. · Learn how to prevent falls. Have your vision and hearing checked regularly. Wear slippers or shoes with a nonskid sole. · Stay at a healthy weight. · Wear comfortable shoes. When should you call for help? Call 911 anytime you think you may need emergency care. For example, call if:  ? · You have sudden chest pain and shortness of breath, or you cough up blood.    ? · You are not able to stand or walk or bear weight. ? · Your buttocks, legs, or feet feel numb or tingly. ? · Your leg or foot is cool or pale or changes color. ? · You have severe pain. ?Call your doctor now or seek immediate medical care if:  ? · You have signs of infection, such as:  ¨ Increased pain, swelling, warmth, or redness in the hip area. ¨ Red streaks leading from the hip area. ¨ Pus draining from the hip area. ¨ A fever. ? · You have signs of a blood clot, such as:  ¨ Pain in your calf, back of the knee, thigh, or groin. ¨ Redness and swelling in your leg or groin. ? · You are not able to bend, straighten, or move your leg normally. ? · You have trouble urinating or having bowel movements. ? Watch closely for changes in your health, and be sure to contact your doctor if:  ? · You do not get better as expected. Where can you learn more? Go to http://huber-selvin.info/. Enter V684 in the search box to learn more about \"Hip Pain: Care Instructions. \"  Current as of: March 20, 2017  Content Version: 11.4  © 6233-9997 [employer]. Care instructions adapted under license by [employer] (which disclaims liability or warranty for this information). If you have questions about a medical condition or this instruction, always ask your healthcare professional. [place] [address] any warranty or liability for your use of this information. Joint Pain: Care Instructions  Your Care Instructions    Many people have small aches and pains from overuse or injury to muscles and joints. Joint injuries often happen during sports or recreation, work tasks, or projects around the home. An overuse injury can happen when you put too much stress on a joint or when you do an activity that stresses the joint over and over, such as using the computer or rowing a boat. You can take action at home to help your muscles and joints get better.  You should feel better in 1 to 2 weeks, but it can take 3 months or more to heal completely. Follow-up care is a key part of your treatment and safety. Be sure to make and go to all appointments, and call your doctor if you are having problems. It's also a good idea to know your test results and keep a list of the medicines you take. How can you care for yourself at home? · Do not put weight on the injured joint for at least a day or two. · For the first day or two after an injury, do not take hot showers or baths, and do not use hot packs. The heat could make swelling worse. · Put ice or a cold pack on the sore joint for 10 to 20 minutes at a time. Try to do this every 1 to 2 hours for the next 3 days (when you are awake) or until the swelling goes down. Put a thin cloth between the ice and your skin. · Wrap the injury in an elastic bandage. Do not wrap it too tightly because this can cause more swelling. · Prop up the sore joint on a pillow when you ice it or anytime you sit or lie down during the next 3 days. Try to keep it above the level of your heart. This will help reduce swelling. · Take an over-the-counter pain medicine, such as acetaminophen (Tylenol), ibuprofen (Advil, Motrin), or naproxen (Aleve). Read and follow all instructions on the label. · After 1 or 2 days of rest, begin moving the joint gently. While the joint is still healing, you can begin to exercise using activities that do not strain or hurt the painful joint. When should you call for help? Call your doctor now or seek immediate medical care if:  ? · You have signs of infection, such as:  ¨ Increased pain, swelling, warmth, and redness. ¨ Red streaks leading from the joint. ¨ A fever. ? Watch closely for changes in your health, and be sure to contact your doctor if:  ? · Your movement or symptoms are not getting better after 1 to 2 weeks of home treatment. Where can you learn more?   Go to http://huber-selvin.info/. Enter P205 in the search box to learn more about \"Joint Pain: Care Instructions. \"  Current as of: March 21, 2017  Content Version: 11.4  © 9641-6091 [employer], [employer]. Care instructions adapted under license by [employer] (which disclaims liability or warranty for this information). If you have questions about a medical condition or this instruction, always ask your healthcare professional. [place] [address] any warranty or liability for your use of this information.

## 2018-05-01 NOTE — ED NOTES
Pt arrived via wheelchair to room #20 from triage. Pt with c/o of right leg pain. Patient stated that when she woke up this am and stepped out of bed and right leg \"gave away. \" Patient noted that pain radiates from groin down to lower leg. Patient is unable to bear weight on right leg at this time. Pt resting in position of comfort. Call bell within reach. Placed on monitor x2.

## 2018-08-09 ENCOUNTER — OFFICE VISIT (OUTPATIENT)
Dept: NEUROLOGY | Age: 73
End: 2018-08-09

## 2018-08-09 VITALS
WEIGHT: 151 LBS | HEART RATE: 81 BPM | HEIGHT: 61 IN | SYSTOLIC BLOOD PRESSURE: 113 MMHG | BODY MASS INDEX: 28.51 KG/M2 | OXYGEN SATURATION: 98 % | DIASTOLIC BLOOD PRESSURE: 70 MMHG

## 2018-08-09 DIAGNOSIS — M54.81 OCCIPITAL NEURALGIA OF RIGHT SIDE: Primary | ICD-10-CM

## 2018-08-09 DIAGNOSIS — I63.9 CEREBROVASCULAR ACCIDENT (CVA), UNSPECIFIED MECHANISM (HCC): ICD-10-CM

## 2018-08-09 RX ORDER — CYANOCOBALAMIN (VITAMIN B-12) 500 MCG
TABLET ORAL DAILY
COMMUNITY

## 2018-08-09 RX ORDER — ASCORBIC ACID 500 MG
TABLET ORAL DAILY
COMMUNITY

## 2018-08-09 RX ORDER — METHYLPREDNISOLONE 4 MG/1
TABLET ORAL
Qty: 1 DOSE PACK | Refills: 0 | Status: SHIPPED | OUTPATIENT
Start: 2018-08-09 | End: 2019-10-21 | Stop reason: ALTCHOICE

## 2018-08-09 RX ORDER — ROSUVASTATIN CALCIUM 10 MG/1
TABLET, COATED ORAL
Refills: 0 | COMMUNITY
Start: 2018-07-11

## 2018-08-09 RX ORDER — DIPHENHYDRAMINE HCL 25 MG
25 TABLET ORAL
COMMUNITY

## 2018-08-09 NOTE — PROGRESS NOTES
HISTORY OF PRESENT ILLNESS  Quynh Ponce is a 72 y.o. female. HPI Comments: Quynh Ponce is a 79-year-old right-handed  female who comes in today complaining of a headache on the right side of her head. I have seen her in the past for stroke. She denies any new strokelike symptoms. This pain has been going on for about the past 3-4 weeks. She denies any visual symptoms with it, she denies any numbness tingling or weakness. Review of Systems   Constitutional:        Positive for anxiety, constipation, cough, depression, diarrhea, fainting, fatigue, hearing loss, leg swelling, memory loss mild, muscle pain, muscle weakness, tinnitus, shortness of breath, skipped cardiac beats, snoring, stomach pain, and weight change, increased a few pounds. Complete review of systems done all others negative. Current Outpatient Prescriptions on File Prior to Visit   Medication Sig Dispense Refill    multivitamin (ONE A DAY) tablet Take 1 Tab by mouth daily.  clonazePAM (KLONOPIN) 0.5 mg tablet Take  by mouth nightly as needed.  meclizine (ANTIVERT) 25 mg tablet Take 1 Tab by mouth three (3) times daily as needed for Dizziness. 20 Tab 0    aspirin 81 mg tablet Take 81 mg by mouth daily.  lidocaine (LIDODERM) 5 % Apply patch to the affected area for 12 hours a day and remove for 12 hours a day. 5 Each 0    naproxen (NAPROSYN) 500 mg tablet Take 1 Tab by mouth every twelve (12) hours as needed for Pain. 20 Tab 0    butalbital-acetaminophen-caffeine (FIORICET, ESGIC) -40 mg per tablet Take 1 Tab by mouth every six (6) hours as needed for Pain. This medication is only to be filled in one month intervals 40 Tab 0    loperamide (IMODIUM) 2 mg capsule Take  by mouth.  docusate sodium (COLACE) 100 mg capsule Take 100 mg by mouth two (2) times a day.  peg 400-propylene glycol (SYSTANE, PROPYLENE GLYCOL,) 0.4-0.3 % drop Administer  to left eye as needed.       ondansetron (ZOFRAN ODT) 4 mg disintegrating tablet Take 1 Tab by mouth every eight (8) hours as needed for Nausea. 10 Tab 0    acetaminophen (TYLENOL) 325 mg tablet Take 650 mg by mouth every four (4) hours as needed. No current facility-administered medications on file prior to visit. Past Medical History:   Diagnosis Date    Arthritis     Cancer ([facility] [address]     vulva    Cerebral artery occlusion with cerebral infarction ([facility] [address]     Hypertension     Shoulder fracture, left      History reviewed. No pertinent family history. Social History   Substance Use Topics    Smoking status: Never Smoker    Smokeless tobacco: Never Used    Alcohol use No     /70  Pulse 81  Ht 5' 1\" (1.549 m)  Wt 151 lb (68.5 kg)  SpO2 98%  BMI 28.53 kg/m2  Physical Exam  Constitutional: Oriented to person, place, and time, appears well-developed and well-nourished. No distress. HENT:   Head: Normocephalic and atraumatic. He is exquisitely tender over the right occipital nerve in the occipital notch    ASSESSMENT and PLAN  RIGHT OCCIPITAL NEURALGIA  I am going to put her on a Medrol Dosepak, she will finish the Dosepak and call us and let us know how this pain in the back of her head is doing. If this does not do the job I will consider occipital nerve injection with Depo-Medrol. HISTORY OF RIGHT SIDED WEAKNESS  Her last MRI BRAIN done [facility] showed:  IMPRESSION:    1. No acute findings. 2. Chronic left pontine lacunar infarcts. 3. Moderate cerebral white matter signal abnormality and chronic white matter  lacunar infarcts consistent with chronic small vessel ischemic change.   This explains her previous stroke symptoms. She will continue to take her aspirin 81 mg a day. We will see how she does on the Medrol Dosepak. This note was created using voice recognition software. Despite editing, there may be syntax errors.

## 2018-08-09 NOTE — LETTER
8/9/2018 3:13 PM 
 
Patient:  Helen Isabel YOB: 1945 Date of Visit: 8/9/2018 Dear Roly Benitez MD 
[address] [place] [address] [address] [place] [address] VIA Facsimile: [phone] 
 : Thank you for referring Ms. Rosy Mir to me for evaluation/treatment. Below are the relevant portions of my assessment and plan of care. HISTORY OF PRESENT ILLNESS Helen Isabel is a 72 y.o. female. HPI Comments: Helen Isabel is a 72-year-old right-handed  female who comes in today complaining of a headache on the right side of her head. I have seen her in the past for stroke. She denies any new strokelike symptoms. This pain has been going on for about the past 3-4 weeks. She denies any visual symptoms with it, she denies any numbness tingling or weakness. Review of Systems Constitutional:  
     Positive for anxiety, constipation, cough, depression, diarrhea, fainting, fatigue, hearing loss, leg swelling, memory loss mild, muscle pain, muscle weakness, tinnitus, shortness of breath, skipped cardiac beats, snoring, stomach pain, and weight change, increased a few pounds. Complete review of systems done all others negative. Current Outpatient Prescriptions on File Prior to Visit Medication Sig Dispense Refill  multivitamin (ONE A DAY) tablet Take 1 Tab by mouth daily.  clonazePAM (KLONOPIN) 0.5 mg tablet Take  by mouth nightly as needed.  meclizine (ANTIVERT) 25 mg tablet Take 1 Tab by mouth three (3) times daily as needed for Dizziness. 20 Tab 0  
 aspirin 81 mg tablet Take 81 mg by mouth daily.  lidocaine (LIDODERM) 5 % Apply patch to the affected area for 12 hours a day and remove for 12 hours a day. 5 Each 0  
 naproxen (NAPROSYN) 500 mg tablet Take 1 Tab by mouth every twelve (12) hours as needed for Pain.  20 Tab 0  
 butalbital-acetaminophen-caffeine (FIORICET, ESGIC) -40 mg per tablet Take 1 Tab by mouth every six (6) hours as needed for Pain. This medication is only to be filled in one month intervals 40 Tab 0  
 loperamide (IMODIUM) 2 mg capsule Take  by mouth.  docusate sodium (COLACE) 100 mg capsule Take 100 mg by mouth two (2) times a day.  peg 400-propylene glycol (SYSTANE, PROPYLENE GLYCOL,) 0.4-0.3 % drop Administer  to left eye as needed.  ondansetron (ZOFRAN ODT) 4 mg disintegrating tablet Take 1 Tab by mouth every eight (8) hours as needed for Nausea. 10 Tab 0  
 acetaminophen (TYLENOL) 325 mg tablet Take 650 mg by mouth every four (4) hours as needed. No current facility-administered medications on file prior to visit. Past Medical History:  
Diagnosis Date  Arthritis  Cancer ([facility] [place] [address] vulva  Cerebral artery occlusion with cerebral infarction ([facility] [address]  Hypertension  Shoulder fracture, left History reviewed. No pertinent family history. Social History Substance Use Topics  Smoking status: Never Smoker  Smokeless tobacco: Never Used  Alcohol use No  
 
/72  Pulse 81  Ht 5' 1\" (1.549 m)  Wt 151 lb (68.5 kg)  SpO2 98%  BMI 28.53 kg/m2 Physical Exam 
Constitutional: Oriented to person, place, and time, appears well-developed and well-nourished. No distress. HENT:  
Head: Normocephalic and atraumatic. He is exquisitely tender over the right occipital nerve in the occipital notch ASSESSMENT and PLAN 
RIGHT OCCIPITAL NEURALGIA I am going to put her on a Medrol Dosepak, she will finish the Dosepak and call us and let us know how this pain in the back of her head is doing. If this does not do the job I will consider occipital nerve injection with Depo-Medrol. HISTORY OF RIGHT SIDED WEAKNESS Her last MRI BRAIN done ED [facility] showed: 
IMPRESSION:   
1. No acute findings. 2. Chronic left pontine lacunar infarcts. 3. Moderate cerebral white matter signal abnormality and chronic white matter lacunar infarcts consistent with chronic small vessel ischemic change. 
 This explains her previous stroke symptoms. She will continue to take her aspirin 81 mg a day. We will see how she does on the Medrol Dosepak. This note was created using voice recognition software. Despite editing, there may be syntax errors. If you have questions, please do not hesitate to call me. I look forward to following Ms. Mic Koenig along with you. Sincerely, Amy Hoff MD

## 2018-08-09 NOTE — PATIENT INSTRUCTIONS
Office Policies    o Phone calls/patient messages:  Please allow up to 24 hours for someone in the office to contact you about your call or message. Be mindful your provider may be out of the office or your message may require further review. We encourage you to use [employer] for your messages as this is a faster, more efficient way to communicate with our office    o Medication Refills:  Prescription medications require up to 48 business hours to process. We encourage you to use [employer] for your refills. For controlled medications: Please allow up to 72 business hours to process. Certain medications may require you to  a written prescription at our office. NO narcotic/controlled medications will be prescribed after 4pm Monday through Friday or on weekends    o Form/Paperwork Completion:  Please note there is a $25 fee for all paperwork completed by our providers. We ask that you allow 7-14 business days. Pre-payment is due prior to picking up/faxing the completed form. You may also download your forms to [employer] to have your doctor print off. A Healthy Lifestyle: Care Instructions  Your Care Instructions    A healthy lifestyle can help you feel good, stay at a healthy weight, and have plenty of energy for both work and play. A healthy lifestyle is something you can share with your whole family. A healthy lifestyle also can lower your risk for serious health problems, such as high blood pressure, heart disease, and diabetes. You can follow a few steps listed below to improve your health and the health of your family. Follow-up care is a key part of your treatment and safety. Be sure to make and go to all appointments, and call your doctor if you are having problems. It's also a good idea to know your test results and keep a list of the medicines you take. How can you care for yourself at home? · Do not eat too much sugar, fat, or fast foods. You can still have dessert and treats now and then. The goal is moderation. · Start small to improve your eating habits. Pay attention to portion sizes, drink less juice and soda pop, and eat more fruits and vegetables. ¨ Eat a healthy amount of food. A 3-ounce serving of meat, for example, is about the size of a deck of cards. Fill the rest of your plate with vegetables and whole grains. ¨ Limit the amount of soda and sports drinks you have every day. Drink more water when you are thirsty. ¨ Eat at least 5 servings of fruits and vegetables every day. It may seem like a lot, but it is not hard to reach this goal. A serving or helping is 1 piece of fruit, 1 cup of vegetables, or 2 cups of leafy, raw vegetables. Have an apple or some carrot sticks as an afternoon snack instead of a candy bar. Try to have fruits and/or vegetables at every meal.  · Make exercise part of your daily routine. You may want to start with simple activities, such as walking, bicycling, or slow swimming. Try to be active 30 to 60 minutes every day. You do not need to do all 30 to 60 minutes all at once. For example, you can exercise 3 times a day for 10 or 20 minutes. Moderate exercise is safe for most people, but it is always a good idea to talk to your doctor before starting an exercise program.  · Keep moving. Jesus Punter the lawn, work in the garden, or [employer]. Take the stairs instead of the elevator at work. · If you smoke, quit. People who smoke have an increased risk for heart attack, stroke, cancer, and other lung illnesses. Quitting is hard, but there are ways to boost your chance of quitting tobacco for good. ¨ Use nicotine gum, patches, or lozenges. ¨ Ask your doctor about stop-smoking programs and medicines. ¨ Keep trying. In addition to reducing your risk of diseases in the future, you will notice some benefits soon after you stop using tobacco. If you have shortness of breath or asthma symptoms, they will likely get better within a few weeks after you quit.   · Limit how much alcohol you drink. Moderate amounts of alcohol (up to 2 drinks a day for men, 1 drink a day for women) are okay. But drinking too much can lead to liver problems, high blood pressure, and other health problems. Family health  If you have a family, there are many things you can do together to improve your health. · Eat meals together as a family as often as possible. · Eat healthy foods. This includes fruits, vegetables, lean meats and dairy, and whole grains. · Include your family in your fitness plan. Most people think of activities such as jogging or tennis as the way to fitness, but there are many ways you and your family can be more active. Anything that makes you breathe hard and gets your heart pumping is exercise. Here are some tips:  ¨ Walk to do errands or to take your child to school or the bus. ¨ Go for a family bike ride after dinner instead of watching TV. Where can you learn more? Go to http://huber-selvin.info/. Enter U271 in the search box to learn more about \"A Healthy Lifestyle: Care Instructions. \"  Current as of: December 7, 2017  Content Version: 11.7  © 5303-6162 [employer]. Care instructions adapted under license by [employer] (which disclaims liability or warranty for this information). If you have questions about a medical condition or this instruction, always ask your healthcare professional. [address] any warranty or liability for your use of this information.

## 2019-01-29 ENCOUNTER — OFFICE VISIT (OUTPATIENT)
Dept: NEUROLOGY | Age: 74
End: 2019-01-29

## 2019-01-29 VITALS
HEART RATE: 78 BPM | WEIGHT: 156 LBS | DIASTOLIC BLOOD PRESSURE: 68 MMHG | HEIGHT: 61 IN | OXYGEN SATURATION: 98 % | SYSTOLIC BLOOD PRESSURE: 115 MMHG | BODY MASS INDEX: 29.45 KG/M2

## 2019-01-29 DIAGNOSIS — M54.81 OCCIPITAL NEURALGIA OF RIGHT SIDE: Primary | ICD-10-CM

## 2019-01-29 NOTE — PATIENT INSTRUCTIONS
Office Policieso Phone calls/patient messages: Please allow up to 24 hours for someone in the office to contact you about your call or message. Be mindful your provider may be out of the office or your message may require further review. We encourage you to use [employer] for your messages as this is a faster, more efficient way to communicate with our office 
o Medication Refills: 
Prescription medications require up to 48 business hours to process. We encourage you to use [employer] for your refills. For controlled medications: Please allow up to 72 business hours to process. Certain medications may require you to  a written prescription at our office. NO narcotic/controlled medications will be prescribed after 4pm Monday through Friday or on weekends 
o Form/Paperwork Completion: We ask that you allow 7-14 business days. You may also download your forms to [employer] to have your doctor print off. A Healthy Lifestyle: Care Instructions Your Care Instructions A healthy lifestyle can help you feel good, stay at a healthy weight, and have plenty of energy for both work and play. A healthy lifestyle is something you can share with your whole family. A healthy lifestyle also can lower your risk for serious health problems, such as high blood pressure, heart disease, and diabetes. You can follow a few steps listed below to improve your health and the health of your family. Follow-up care is a key part of your treatment and safety. Be sure to make and go to all appointments, and call your doctor if you are having problems. It's also a good idea to know your test results and keep a list of the medicines you take. How can you care for yourself at home? · Do not eat too much sugar, fat, or fast foods. You can still have dessert and treats now and then. The goal is moderation. · Start small to improve your eating habits.  Pay attention to portion sizes, drink less juice and soda pop, and eat more fruits and vegetables. ? Eat a healthy amount of food. A 3-ounce serving of meat, for example, is about the size of a deck of cards. Fill the rest of your plate with vegetables and whole grains. ? Limit the amount of soda and sports drinks you have every day. Drink more water when you are thirsty. ? Eat at least 5 servings of fruits and vegetables every day. It may seem like a lot, but it is not hard to reach this goal. A serving or helping is 1 piece of fruit, 1 cup of vegetables, or 2 cups of leafy, raw vegetables. Have an apple or some carrot sticks as an afternoon snack instead of a candy bar. Try to have fruits and/or vegetables at every meal. 
· Make exercise part of your daily routine. You may want to start with simple activities, such as walking, bicycling, or slow swimming. Try to be active 30 to 60 minutes every day. You do not need to do all 30 to 60 minutes all at once. For example, you can exercise 3 times a day for 10 or 20 minutes. Moderate exercise is safe for most people, but it is always a good idea to talk to your doctor before starting an exercise program. 
· Keep moving. Nancy Banner the lawn, work in the garden, or [employer]. Take the stairs instead of the elevator at work. · If you smoke, quit. People who smoke have an increased risk for heart attack, stroke, cancer, and other lung illnesses. Quitting is hard, but there are ways to boost your chance of quitting tobacco for good. ? Use nicotine gum, patches, or lozenges. ? Ask your doctor about stop-smoking programs and medicines. ? Keep trying. In addition to reducing your risk of diseases in the future, you will notice some benefits soon after you stop using tobacco. If you have shortness of breath or asthma symptoms, they will likely get better within a few weeks after you quit. · Limit how much alcohol you drink.  Moderate amounts of alcohol (up to 2 drinks a day for men, 1 drink a day for women) are okay. But drinking too much can lead to liver problems, high blood pressure, and other health problems. Family health If you have a family, there are many things you can do together to improve your health. · Eat meals together as a family as often as possible. · Eat healthy foods. This includes fruits, vegetables, lean meats and dairy, and whole grains. · Include your family in your fitness plan. Most people think of activities such as jogging or tennis as the way to fitness, but there are many ways you and your family can be more active. Anything that makes you breathe hard and gets your heart pumping is exercise. Here are some tips: 
? Walk to do errands or to take your child to school or the bus. 
? Go for a family bike ride after dinner instead of watching TV. Where can you learn more? Go to http://huber-selvin.info/. Enter O525 in the search box to learn more about \"A Healthy Lifestyle: Care Instructions. \" Current as of: September 11, 2018 Content Version: 11.9 © 9955-7729 [employer]. Care instructions adapted under license by [employer] (which disclaims liability or warranty for this information). If you have questions about a medical condition or this instruction, always ask your healthcare professional. [address] any warranty or liability for your use of this information.

## 2019-01-29 NOTE — PROGRESS NOTES
HISTORY OF PRESENT ILLNESS Michael Frausto is a 72 y.o. female. HPI Comments: Michael Frausto is a 68-year-old right-handed  female who comes in today complaining of a headache on the right side of her head. I have seen her in the past for stroke. She denies any new strokelike symptoms. This pain has been going on for about the past 3-4 weeks. She denies any visual symptoms with it, she denies any numbness tingling or weakness. Review of Systems Constitutional:  
     Positive for anxiety, constipation, cough, depression, diarrhea, fainting, fatigue, hearing loss, leg swelling, memory loss mild, muscle pain, muscle weakness, tinnitus, shortness of breath, skipped cardiac beats, snoring, stomach pain, and weight change, increased a few pounds. Complete review of systems done all others negative. Current Outpatient Medications on File Prior to Visit Medication Sig Dispense Refill  rosuvastatin (CRESTOR) 10 mg tablet take 1 tablet by mouth once daily  0  
 ascorbic acid, vitamin C, (VITAMIN C) 500 mg tablet Take  by mouth daily.  diphenhydrAMINE (BENADRYL ALLERGY) 25 mg tablet Take 25 mg by mouth nightly.  cholecalciferol (VITAMIN D3) 400 unit tab tablet Take  by mouth daily.  methylPREDNISolone (MEDROL DOSEPACK) 4 mg tablet As directed  Indications: Occipital neuralgia 1 Dose Pack 0  
 naproxen (NAPROSYN) 500 mg tablet Take 1 Tab by mouth every twelve (12) hours as needed for Pain. 20 Tab 0  
 loperamide (IMODIUM) 2 mg capsule Take  by mouth.  docusate sodium (COLACE) 100 mg capsule Take 100 mg by mouth two (2) times a day.  peg 400-propylene glycol (SYSTANE, PROPYLENE GLYCOL,) 0.4-0.3 % drop Administer  to left eye as needed.  multivitamin (ONE A DAY) tablet Take 1 Tab by mouth daily.  clonazePAM (KLONOPIN) 0.5 mg tablet Take  by mouth nightly as needed.     
 meclizine (ANTIVERT) 25 mg tablet Take 1 Tab by mouth three (3) times daily as needed for Dizziness. 20 Tab 0  
 ondansetron (ZOFRAN ODT) 4 mg disintegrating tablet Take 1 Tab by mouth every eight (8) hours as needed for Nausea. 10 Tab 0  
 acetaminophen (TYLENOL) 325 mg tablet Take 650 mg by mouth every four (4) hours as needed.  aspirin 81 mg tablet Take 81 mg by mouth daily.  lidocaine (LIDODERM) 5 % Apply patch to the affected area for 12 hours a day and remove for 12 hours a day. 5 Each 0  
 butalbital-acetaminophen-caffeine (FIORICET, ESGIC) -40 mg per tablet Take 1 Tab by mouth every six (6) hours as needed for Pain. This medication is only to be filled in one month intervals 40 Tab 0 No current facility-administered medications on file prior to visit. Past Medical History:  
Diagnosis Date  Arthritis  Cancer ([facility] [address] vulva  Cerebral artery occlusion with cerebral infarction ([facility] [address]  Hypertension  Shoulder fracture, left History reviewed. No pertinent family history. Social History Tobacco Use  Smoking status: Never Smoker  Smokeless tobacco: Never Used Substance Use Topics  Alcohol use: No  
 Drug use: No  
 
/70   Pulse 78   Ht 5' 1\" (1.549 m)   Wt 70.8 kg (156 lb)   SpO2 98%   BMI 29.48 kg/m² Physical Exam 
Constitutional: Oriented to person, place, and time, appears well-developed and well-nourished. No distress. HENT:  
Head: Normocephalic and atraumatic. He is exquisitely tender over the right occipital nerve in the occipital notch ASSESSMENT and PLAN 
RIGHT OCCIPITAL NEURALGIA Alfredo Rout Her right occipital neuralgia continues to bother her, she is not interested in having an injection. I recommended that she use ice packs frequently to the right occipital region and take 2 of what ever her favorite over-the-counter medicines are. HISTORY OF RIGHT SIDED WEAKNESS Her last MRI BRAIN done ED [facility] showed: 
IMPRESSION:   
1. No acute findings. 2. Chronic left pontine lacunar infarcts. 3. Moderate cerebral white matter signal abnormality and chronic white matter 
lacunar infarcts consistent with chronic small vessel ischemic change. 
 
 This explains her previous stroke symptoms. She will continue to take her aspirin 81 mg a day. This note will not be viewable in [address]

## 2019-10-21 ENCOUNTER — OFFICE VISIT (OUTPATIENT)
Dept: NEUROLOGY | Age: 74
End: 2019-10-21

## 2019-10-21 VITALS
BODY MASS INDEX: 29.83 KG/M2 | DIASTOLIC BLOOD PRESSURE: 75 MMHG | SYSTOLIC BLOOD PRESSURE: 141 MMHG | OXYGEN SATURATION: 98 % | WEIGHT: 158 LBS | HEIGHT: 61 IN | HEART RATE: 88 BPM

## 2019-10-21 DIAGNOSIS — I63.9 CEREBROVASCULAR ACCIDENT (CVA), UNSPECIFIED MECHANISM (HCC): Primary | ICD-10-CM

## 2019-10-21 DIAGNOSIS — I10 ESSENTIAL HYPERTENSION: ICD-10-CM

## 2019-10-21 DIAGNOSIS — M54.81 OCCIPITAL NEURALGIA OF RIGHT SIDE: ICD-10-CM

## 2019-10-21 DIAGNOSIS — E78.2 MIXED HYPERLIPIDEMIA: ICD-10-CM

## 2019-10-21 RX ORDER — BUTALBITAL, ACETAMINOPHEN AND CAFFEINE 50; 325; 40 MG/1; MG/1; MG/1
TABLET ORAL
Qty: 20 TAB | Refills: 2 | Status: SHIPPED | OUTPATIENT
Start: 2019-10-21 | End: 2020-04-21 | Stop reason: ALTCHOICE

## 2019-10-21 NOTE — PROGRESS NOTES
Neurology follow-up note    Chief Complaint   Patient presents with    Follow-up     headache       SUBJECTIVE  Waylon Johnson is a 69 y.o. female who presented to the neurology office for management of headaches. She started having her headaches after a fall in August 2018. She fell backwards. She has been having a right occipital headaches and radiates to the right frontal area. It is a sharp pain. No nausea, vomiting, photo or phonophobia. She has headaches everyday. She takes meclizine for headaches. She     Current Outpatient Medications   Medication Sig    rosuvastatin (CRESTOR) 10 mg tablet take 1 tablet by mouth once daily    ascorbic acid, vitamin C, (VITAMIN C) 500 mg tablet Take  by mouth daily.  diphenhydrAMINE (BENADRYL ALLERGY) 25 mg tablet Take 25 mg by mouth nightly.  cholecalciferol (VITAMIN D3) 400 unit tab tablet Take  by mouth daily.  lidocaine (LIDODERM) 5 % Apply patch to the affected area for 12 hours a day and remove for 12 hours a day.  naproxen (NAPROSYN) 500 mg tablet Take 1 Tab by mouth every twelve (12) hours as needed for Pain.  butalbital-acetaminophen-caffeine (FIORICET, ESGIC) -40 mg per tablet Take 1 Tab by mouth every six (6) hours as needed for Pain. This medication is only to be filled in one month intervals    loperamide (IMODIUM) 2 mg capsule Take  by mouth.  docusate sodium (COLACE) 100 mg capsule Take 100 mg by mouth two (2) times a day.  peg 400-propylene glycol (SYSTANE, PROPYLENE GLYCOL,) 0.4-0.3 % drop Administer  to left eye as needed.  multivitamin (ONE A DAY) tablet Take 1 Tab by mouth daily.  clonazePAM (KLONOPIN) 0.5 mg tablet Take  by mouth nightly as needed.  meclizine (ANTIVERT) 25 mg tablet Take 1 Tab by mouth three (3) times daily as needed for Dizziness.  ondansetron (ZOFRAN ODT) 4 mg disintegrating tablet Take 1 Tab by mouth every eight (8) hours as needed for Nausea.     acetaminophen (TYLENOL) 325 mg tablet Take 650 mg by mouth every four (4) hours as needed.  aspirin 81 mg tablet Take 81 mg by mouth daily. No current facility-administered medications for this visit. Past Medical History:   Diagnosis Date    Arthritis     Cancer ([facility] [facility]     vulva    Cerebral artery occlusion with cerebral infarction ([facility]     Hypertension     Shoulder fracture, left      Past Surgical History:   Procedure Laterality Date    HX CHOLECYSTECTOMY      HX GYN       No family history on file. Social History     Tobacco Use    Smoking status: Never Smoker    Smokeless tobacco: Never Used   Substance Use Topics    Alcohol use: No    Drug use: No       REVIEW OF SYSTEMS:   A ten system review of constitutional, cardiovascular, respiratory, musculoskeletal, endocrine, skin, SHEENT, genitourinary, psychiatric and neurologic systems was obtained and is unremarkable except headaches    EXAMINATION:   Visit Vitals  /75   Pulse 88   Ht 5' 1\" (1.549 m)   Wt 158 lb (71.7 kg)   SpO2 98%   BMI 29.85 kg/m²        General:   General appearance: Pt is in no acute distress   Distal pulses are preserved    Neurological Examination:   Mental Status: AAO x3. Speech is fluent. Follows commands, has normal fund of knowledge, attention, short term recall, comprehension and insight. Cranial Nerves: Visual fields are full. PERRL, Extraocular movements are full. Facial sensation intact. Facial movement intact. Hearing intact to conversation. Palate elevates symmetrically. Shoulder shrug symmetric. Tongue midline. Motor: Strength is 5/5 in all 4 ext. Sensation: Light touch - Normal    Coordination/Cerebellar: Intact to finger-nose-finger     Skin: No significant bruising or lacerations.     Laboratory review:   Results for orders placed or performed during the hospital encounter of 12/05/17   CULTURE, URINE   Result Value Ref Range    Special Requests: NO SPECIAL REQUESTS  Reflexed from [id number]        [place] Count >100,000  COLONIES/mL        Culture result: MIXED UROGENITAL SAVITA ISOLATED     CBC WITH AUTOMATED DIFF   Result Value Ref Range    WBC 7.8 3.6 - 11.0 K/uL    RBC 4.52 3.80 - 5.20 M/uL    HGB 13.2 11.5 - 16.0 g/dL    HCT 39.5 35.0 - 47.0 %    MCV 87.4 80.0 - 99.0 FL    MCH 29.2 26.0 - 34.0 PG    MCHC 33.4 30.0 - 36.5 g/dL    RDW 13.0 11.5 - 14.5 %    PLATELET 252 666 - 899 K/uL    NEUTROPHILS 76 (H) 32 - 75 %    LYMPHOCYTES 15 12 - 49 %    MONOCYTES 8 5 - 13 %    EOSINOPHILS 1 0 - 7 %    BASOPHILS 0 0 - 1 %    ABS. NEUTROPHILS 5.8 1.8 - 8.0 K/UL    ABS. LYMPHOCYTES 1.2 0.8 - 3.5 K/UL    ABS. MONOCYTES 0.6 0.0 - 1.0 K/UL    ABS. EOSINOPHILS 0.1 0.0 - 0.4 K/UL    ABS. BASOPHILS 0.0 0.0 - 0.1 K/UL   METABOLIC PANEL, COMPREHENSIVE   Result Value Ref Range    Sodium 142 136 - 145 mmol/L    Potassium 3.5 3.5 - 5.1 mmol/L    Chloride 104 97 - 108 mmol/L    CO2 32 21 - 32 mmol/L    Anion gap 6 5 - 15 mmol/L    Glucose 144 (H) 65 - 100 mg/dL    BUN 13 6 - 20 MG/DL    Creatinine 0.74 0.55 - 1.02 MG/DL    BUN/Creatinine ratio 18 12 - 20      GFR est AA >60 >60 ml/min/1.73m2    GFR est non-AA >60 >60 ml/min/1.73m2    Calcium 8.5 8.5 - 10.1 MG/DL    Bilirubin, total 0.2 0.2 - 1.0 MG/DL    ALT (SGPT) 50 12 - 78 U/L    AST (SGOT) 46 (H) 15 - 37 U/L    Alk.  phosphatase 79 45 - 117 U/L    Protein, total 6.9 6.4 - 8.2 g/dL    Albumin 3.3 (L) 3.5 - 5.0 g/dL    Globulin 3.6 2.0 - 4.0 g/dL    A-G Ratio 0.9 (L) 1.1 - 2.2     URINALYSIS W/ REFLEX CULTURE   Result Value Ref Range    Color YELLOW/STRAW      Appearance HAZY (A) CLEAR      Specific gravity 1.020 1.003 - 1.030      pH (UA) 6.0 5.0 - 8.0      Protein NEGATIVE  NEG mg/dL    Glucose NEGATIVE  NEG mg/dL    Ketone NEGATIVE  NEG mg/dL    Bilirubin NEGATIVE  NEG      Blood SMALL (A) NEG      Urobilinogen 0.2 0.2 - 1.0 EU/dL    Nitrites NEGATIVE  NEG      Leukocyte Esterase MODERATE (A) NEG      WBC 10-20 0 - 4 /hpf    RBC 5-10 0 - 5 /hpf    Epithelial cells MANY (A) FEW /lpf Bacteria 2+ (A) NEG /hpf    UA:UC IF INDICATED URINE CULTURE ORDERED (A) CNI      Other: Renal Epithelial cells Present     TROPONIN I   Result Value Ref Range    Troponin-I, Qt. <0.04 <0.05 ng/mL   EKG, 12 LEAD, INITIAL   Result Value Ref Range    Ventricular Rate 73 BPM    Atrial Rate 73 BPM    P-R Interval 170 ms    QRS Duration 138 ms    Q-T Interval 422 ms    QTC Calculation (Bezet) 464 ms    Calculated P Axis 64 degrees    Calculated R Axis -65 degrees    Calculated T Axis 15 degrees    Diagnosis       Normal sinus rhythm  Right bundle branch block  Left anterior fascicular block  ** Bifascicular block **  Minimal voltage criteria for LVH, may be normal variant  When compared with ECG of 18-NOV-2017 12:26,  No significant change was found  Confirmed by [address], [place] ([id number]) on 12/5/2017 9:23:14 PM  Also confirmed by Inder Edmond ([id number])  on 12/5/2017 9:42:52 PM         Imaging review:  2/17/2018  MRI brain without contrast  Chronic left pontine lacunar infarction    MRA neck without contrast  Normal    Documentation review:  I requested records from [address] [place] [address] providers in preparation for my face-to-face visit with her today regarding her presenting complaint. I spent 35 minutes performing a non-face-to-face review of these records and they are summarized below. I reviewed Dr. Herrera Age note from 1/29/2019. Patient is here complaining of headache on the right side of the head. I have seen the patient in the past for a stroke. There are no new strokelike symptoms. This has been going on for the past 3 to 4 weeks. She is not interested in having an occipital nerve block. I have recommended that she uses ice packs frequently to the right occipital region and take 2 of what ever her favorite over-the-counter medications are. Assessment/Plan:   Kurt Hong is a 69 y.o. female who presented to the neurology office for right occipital neuralgia. Pt does not want occipital nerve block. Will order fioricet. Pt did have a left pontine infarct. Cont aspirin 81 mg a day and rosuvastatin 10 mg daily. 3 most recent PHQ Screens 1/29/2019   Little interest or pleasure in doing things Several days   Feeling down, depressed, irritable, or hopeless Several days   Total Score PHQ 2 2     Primary care to address possible depression if PHQ-9 score is more than 9. ICD-10-CM ICD-9-CM    1. Cerebrovascular accident (CVA), unspecified mechanism ([facility] I63.9 434.91    2. Occipital neuralgia of right side M54.81 723.8         Araseli Yoo MD  Neurologist    CC: Bridger Bah MD  Fax: [phone]    This note was created using voice recognition software. Despite editing, there may be syntax errors.

## 2019-10-21 NOTE — PATIENT INSTRUCTIONS

## 2020-04-21 ENCOUNTER — VIRTUAL VISIT (OUTPATIENT)
Dept: NEUROLOGY | Age: 75
End: 2020-04-21

## 2020-04-21 DIAGNOSIS — M54.81 OCCIPITAL NEURALGIA OF RIGHT SIDE: ICD-10-CM

## 2020-04-21 DIAGNOSIS — I63.9 CEREBROVASCULAR ACCIDENT (CVA), UNSPECIFIED MECHANISM (HCC): Primary | ICD-10-CM

## 2020-04-21 DIAGNOSIS — I10 ESSENTIAL HYPERTENSION: ICD-10-CM

## 2020-04-21 DIAGNOSIS — E78.2 MIXED HYPERLIPIDEMIA: ICD-10-CM

## 2020-04-21 NOTE — PATIENT INSTRUCTIONS

## 2020-04-21 NOTE — PROGRESS NOTES
Neurology follow-up note    Chief Complaint   Patient presents with    Follow-up     headache, lump on head pain    Cerebrovascular Accident       Kyle Gage is a 69 y.o. female who presented to the neurology office for management of headaches. She started having her headaches after a fall in August 2018. She fell backwards. She has been having a right occipital headaches and radiates to the right frontal area. It is a sharp pain. No nausea, vomiting, photo or phonophobia. She has headaches everyday. She takes meclizine for headaches. Interval Hx:  - 1/wk headache and she takes fioricet and that helps  - No new stroke like symptoms. Current Outpatient Medications   Medication Sig    rosuvastatin (CRESTOR) 10 mg tablet take 1 tablet by mouth once daily    ascorbic acid, vitamin C, (VITAMIN C) 500 mg tablet Take  by mouth daily.  diphenhydrAMINE (BENADRYL ALLERGY) 25 mg tablet Take 25 mg by mouth nightly.  cholecalciferol (VITAMIN D3) 400 unit tab tablet Take  by mouth daily.  naproxen (NAPROSYN) 500 mg tablet Take 1 Tab by mouth every twelve (12) hours as needed for Pain.  docusate sodium (COLACE) 100 mg capsule Take 100 mg by mouth two (2) times a day.  peg 400-propylene glycol (SYSTANE, PROPYLENE GLYCOL,) 0.4-0.3 % drop Administer  to left eye as needed.  multivitamin (ONE A DAY) tablet Take 1 Tab by mouth daily.  clonazePAM (KLONOPIN) 0.5 mg tablet Take  by mouth nightly as needed.  meclizine (ANTIVERT) 25 mg tablet Take 1 Tab by mouth three (3) times daily as needed for Dizziness.  ondansetron (ZOFRAN ODT) 4 mg disintegrating tablet Take 1 Tab by mouth every eight (8) hours as needed for Nausea.  aspirin 81 mg tablet Take 81 mg by mouth daily. No current facility-administered medications for this visit.         Past Medical History:   Diagnosis Date    Arthritis     Cancer ([address]     vulva    Cerebral artery occlusion with cerebral infarction ([facility] [address] [facility]     Hypertension     Shoulder fracture, left      Past Surgical History:   Procedure Laterality Date    HX CHOLECYSTECTOMY      HX GYN       No family history on file. Social History     Tobacco Use    Smoking status: Never Smoker    Smokeless tobacco: Never Used   Substance Use Topics    Alcohol use: No    Drug use: No       REVIEW OF SYSTEMS:   A ten system review of constitutional, cardiovascular, respiratory, musculoskeletal, endocrine, skin, SHEENT, genitourinary, psychiatric and neurologic systems was obtained and is unremarkable except headaches    EXAMINATION:   There were no vitals taken for this visit. Telephone visit. Laboratory review:   Results for orders placed or performed during the hospital encounter of 12/05/17   CULTURE, URINE   Result Value Ref Range    Special Requests: NO SPECIAL REQUESTS  Reflexed from [id number]        [place] Count >100,000  COLONIES/mL        Culture result: MIXED UROGENITAL SAVITA ISOLATED     CBC WITH AUTOMATED DIFF   Result Value Ref Range    WBC 7.8 3.6 - 11.0 K/uL    RBC 4.52 3.80 - 5.20 M/uL    HGB 13.2 11.5 - 16.0 g/dL    HCT 39.5 35.0 - 47.0 %    MCV 87.4 80.0 - 99.0 FL    MCH 29.2 26.0 - 34.0 PG    MCHC 33.4 30.0 - 36.5 g/dL    RDW 13.0 11.5 - 14.5 %    PLATELET 676 388 - 162 K/uL    NEUTROPHILS 76 (H) 32 - 75 %    LYMPHOCYTES 15 12 - 49 %    MONOCYTES 8 5 - 13 %    EOSINOPHILS 1 0 - 7 %    BASOPHILS 0 0 - 1 %    ABS. NEUTROPHILS 5.8 1.8 - 8.0 K/UL    ABS. LYMPHOCYTES 1.2 0.8 - 3.5 K/UL    ABS. MONOCYTES 0.6 0.0 - 1.0 K/UL    ABS. EOSINOPHILS 0.1 0.0 - 0.4 K/UL    ABS.  BASOPHILS 0.0 0.0 - 0.1 K/UL   METABOLIC PANEL, COMPREHENSIVE   Result Value Ref Range    Sodium 142 136 - 145 mmol/L    Potassium 3.5 3.5 - 5.1 mmol/L    Chloride 104 97 - 108 mmol/L    CO2 32 21 - 32 mmol/L    Anion gap 6 5 - 15 mmol/L    Glucose 144 (H) 65 - 100 mg/dL    BUN 13 6 - 20 MG/DL    Creatinine 0.74 0.55 - 1.02 MG/DL    BUN/Creatinine ratio 18 12 - 20      GFR est AA >60 >60 ml/min/1.73m2    GFR est non-AA >60 >60 ml/min/1.73m2    Calcium 8.5 8.5 - 10.1 MG/DL    Bilirubin, total 0.2 0.2 - 1.0 MG/DL    ALT (SGPT) 50 12 - 78 U/L    AST (SGOT) 46 (H) 15 - 37 U/L    Alk.  phosphatase 79 45 - 117 U/L    Protein, total 6.9 6.4 - 8.2 g/dL    Albumin 3.3 (L) 3.5 - 5.0 g/dL    Globulin 3.6 2.0 - 4.0 g/dL    A-G Ratio 0.9 (L) 1.1 - 2.2     URINALYSIS W/ REFLEX CULTURE   Result Value Ref Range    Color YELLOW/STRAW      Appearance HAZY (A) CLEAR      Specific gravity 1.020 1.003 - 1.030      pH (UA) 6.0 5.0 - 8.0      Protein NEGATIVE  NEG mg/dL    Glucose NEGATIVE  NEG mg/dL    Ketone NEGATIVE  NEG mg/dL    Bilirubin NEGATIVE  NEG      Blood SMALL (A) NEG      Urobilinogen 0.2 0.2 - 1.0 EU/dL    Nitrites NEGATIVE  NEG      Leukocyte Esterase MODERATE (A) NEG      WBC 10-20 0 - 4 /hpf    RBC 5-10 0 - 5 /hpf    Epithelial cells MANY (A) FEW /lpf    Bacteria 2+ (A) NEG /hpf    UA:UC IF INDICATED URINE CULTURE ORDERED (A) CNI      Other: Renal Epithelial cells Present     TROPONIN I   Result Value Ref Range    Troponin-I, Qt. <0.04 <0.05 ng/mL   EKG, 12 LEAD, INITIAL   Result Value Ref Range    Ventricular Rate 73 BPM    Atrial Rate 73 BPM    P-R Interval 170 ms    QRS Duration 138 ms    Q-T Interval 422 ms    QTC Calculation (Bezet) 464 ms    Calculated P Axis 64 degrees    Calculated R Axis -65 degrees    Calculated T Axis 15 degrees    Diagnosis       Normal sinus rhythm  Right bundle branch block  Left anterior fascicular block  ** Bifascicular block **  Minimal voltage criteria for LVH, may be normal variant  When compared with ECG of 18-NOV-2017 12:26,  No significant change was found  Confirmed by Aaron Menjivar ([id number]) on 12/5/2017 9:23:14 PM  Also confirmed by Rajendra Ball ([id number])  on 12/5/2017 9:42:52 PM         Imaging review:  2/17/2018  MRI brain without contrast  Chronic left pontine lacunar infarction    MRA neck without contrast  Normal    Documentation review:  I requested records from [facility] providers in preparation for my face-to-face visit with her today regarding her presenting complaint. I spent 35 minutes performing a non-face-to-face review of these records and they are summarized below. I reviewed Dr. Gregg Van note from 1/29/2019. Patient is here complaining of headache on the right side of the head. I have seen the patient in the past for a stroke. There are no new strokelike symptoms. This has been going on for the past 3 to 4 weeks. She is not interested in having an occipital nerve block. I have recommended that she uses ice packs frequently to the right occipital region and take 2 of what ever her favorite over-the-counter medications are. Assessment/Plan:   Sarita Romero is a 69 y.o. female who presented to the neurology office for right occipital neuralgia. Pt does not want occipital nerve block. Pt is on fioricet and that is helping. Will continue. She states there is a spot in the head that needs to be checked out. Have me to examine that once she is in the office in around 3 months. Pt did have a left pontine infarct. Cont aspirin 81 mg a day and rosuvastatin 10 mg daily. Continue to observe. FU 3 months. 3 most recent PHQ Screens 1/29/2019   Little interest or pleasure in doing things Several days   Feeling down, depressed, irritable, or hopeless Several days   Total Score PHQ 2 2     Primary care to address possible depression if PHQ-9 score is more than 9. ICD-10-CM ICD-9-CM    1. Cerebrovascular accident (CVA), unspecified mechanism ([facility] I63.9 434.91    2. Occipital neuralgia of right side M54.81 723.8    3. Mixed hyperlipidemia E78.2 272.2    4. Essential hypertension I10 401.9         Rima Au MD  Neurologist    CC: Mehnaz Eid MD  Fax: [phone]    This note was created using voice recognition software. Despite editing, there may be syntax errors.          Sarita Romero is a 69 y.o. female evaluated via telephone on 4/21/2020. Consent:  She and/or health care decision maker is aware that that she may receive a bill for this telephone service, depending on her insurance coverage, and has provided verbal consent to proceed: Yes    Documentation:  I communicated with the patient and/or health care decision maker about his presenting symptoms. Details of this discussion including any medical advice provided: See notes      I affirm this is a Patient Initiated Episode with an Established Patient who has not had a related appointment within my department in the past 7 days or scheduled within the next 24 hours.     Total Time: minutes: 21-30 minutes    Note: not billable if this call serves to triage the patient into an appointment for the relevant concern

## 2020-07-17 ENCOUNTER — OFFICE VISIT (OUTPATIENT)
Dept: NEUROLOGY | Age: 75
End: 2020-07-17

## 2020-07-17 VITALS
HEART RATE: 85 BPM | OXYGEN SATURATION: 98 % | HEIGHT: 61 IN | WEIGHT: 163 LBS | BODY MASS INDEX: 30.78 KG/M2 | SYSTOLIC BLOOD PRESSURE: 120 MMHG | DIASTOLIC BLOOD PRESSURE: 65 MMHG

## 2020-07-17 DIAGNOSIS — I63.9 CEREBROVASCULAR ACCIDENT (CVA), UNSPECIFIED MECHANISM (HCC): Primary | ICD-10-CM

## 2020-07-17 DIAGNOSIS — M54.81 OCCIPITAL NEURALGIA OF RIGHT SIDE: ICD-10-CM

## 2020-07-17 DIAGNOSIS — E78.2 MIXED HYPERLIPIDEMIA: ICD-10-CM

## 2020-07-17 DIAGNOSIS — I10 ESSENTIAL HYPERTENSION: ICD-10-CM

## 2020-07-17 NOTE — PROGRESS NOTES
Neurology follow-up note    Chief Complaint   Patient presents with    Follow-up    Dizziness       SUBJECTIVE  Kristyn King is a 70 y.o. female who presented to the neurology office for management of headaches. She started having her headaches after a fall in August 2018. She fell backwards. She has been having a right occipital headaches and radiates to the right frontal area. It is a sharp pain. No nausea, vomiting, photo or phonophobia. She has headaches everyday. She takes meclizine for headaches. Interval Hx:  - She does have daily headache and she takes naproxen  - No new stroke like symptoms. Current Outpatient Medications   Medication Sig    rosuvastatin (CRESTOR) 10 mg tablet take 1 tablet by mouth once daily    ascorbic acid, vitamin C, (VITAMIN C) 500 mg tablet Take  by mouth daily.  diphenhydrAMINE (BENADRYL ALLERGY) 25 mg tablet Take 25 mg by mouth nightly.  cholecalciferol (VITAMIN D3) 400 unit tab tablet Take  by mouth daily.  naproxen (NAPROSYN) 500 mg tablet Take 1 Tab by mouth every twelve (12) hours as needed for Pain.  docusate sodium (COLACE) 100 mg capsule Take 100 mg by mouth two (2) times a day.  peg 400-propylene glycol (SYSTANE, PROPYLENE GLYCOL,) 0.4-0.3 % drop Administer  to left eye as needed.  multivitamin (ONE A DAY) tablet Take 1 Tab by mouth daily.  clonazePAM (KLONOPIN) 0.5 mg tablet Take  by mouth nightly as needed.  meclizine (ANTIVERT) 25 mg tablet Take 1 Tab by mouth three (3) times daily as needed for Dizziness.  ondansetron (ZOFRAN ODT) 4 mg disintegrating tablet Take 1 Tab by mouth every eight (8) hours as needed for Nausea.  aspirin 81 mg tablet Take 81 mg by mouth daily. No current facility-administered medications for this visit.         Past Medical History:   Diagnosis Date    Arthritis     Cancer ([address]     vulva    Cerebral artery occlusion with cerebral infarction ([address]     Hypertension     Shoulder fracture, left      Past Surgical History:   Procedure Laterality Date    HX CHOLECYSTECTOMY      HX GYN       No family history on file. Social History     Tobacco Use    Smoking status: Never Smoker    Smokeless tobacco: Never Used   Substance Use Topics    Alcohol use: No    Drug use: No       REVIEW OF SYSTEMS:   A ten system review of constitutional, cardiovascular, respiratory, musculoskeletal, endocrine, skin, SHEENT, genitourinary, psychiatric and neurologic systems was obtained and is unremarkable except headaches    EXAMINATION:   Visit Vitals  /65   Pulse 85   Ht 5' 1\" (1.549 m)   Wt 163 lb (73.9 kg)   SpO2 98%   BMI 30.80 kg/m²      EXAMINATION  Visit Vitals  /65   Pulse 85   Ht 5' 1\" (1.549 m)   Wt 163 lb (73.9 kg)   SpO2 98%   BMI 30.80 kg/m²        General:   General appearance: Pt is in no acute distress   Distal pulses are preserved    Neurological Examination:   Mental Status: AAO x3. Speech is fluent. Follows commands, has normal fund of knowledge, attention, short term recall, comprehension and insight. Cranial Nerves: Visual fields are full. PERRL, Extraocular movements are full. Facial sensation intact V1- V3. Facial movement intact, symmetric. Hearing intact to conversation. Palate elevates symmetrically. Shoulder shrug symmetric. Tongue midline. Motor: Strength is 5/5 in all 4 ext. Normal tone. No atrophy.      Sensation: Light touch - Normal    Coordination/Cerebellar: Intact to finger-nose-finger     Mild bradykinesia and cog wheel rigidity      Laboratory review:   Results for orders placed or performed during the hospital encounter of 12/05/17   CULTURE, URINE    Specimen: Urine   Result Value Ref Range    Special Requests: NO SPECIAL REQUESTS  Reflexed from [id number]        [place] Count >100,000  COLONIES/mL        Culture result: MIXED UROGENITAL SAVITA ISOLATED     CBC WITH AUTOMATED DIFF   Result Value Ref Range    WBC 7.8 3.6 - 11.0 K/uL    RBC 4.52 3.80 - 5.20 M/uL HGB 13.2 11.5 - 16.0 g/dL    HCT 39.5 35.0 - 47.0 %    MCV 87.4 80.0 - 99.0 FL    MCH 29.2 26.0 - 34.0 PG    MCHC 33.4 30.0 - 36.5 g/dL    RDW 13.0 11.5 - 14.5 %    PLATELET 708 626 - 337 K/uL    NEUTROPHILS 76 (H) 32 - 75 %    LYMPHOCYTES 15 12 - 49 %    MONOCYTES 8 5 - 13 %    EOSINOPHILS 1 0 - 7 %    BASOPHILS 0 0 - 1 %    ABS. NEUTROPHILS 5.8 1.8 - 8.0 K/UL    ABS. LYMPHOCYTES 1.2 0.8 - 3.5 K/UL    ABS. MONOCYTES 0.6 0.0 - 1.0 K/UL    ABS. EOSINOPHILS 0.1 0.0 - 0.4 K/UL    ABS. BASOPHILS 0.0 0.0 - 0.1 K/UL   METABOLIC PANEL, COMPREHENSIVE   Result Value Ref Range    Sodium 142 136 - 145 mmol/L    Potassium 3.5 3.5 - 5.1 mmol/L    Chloride 104 97 - 108 mmol/L    CO2 32 21 - 32 mmol/L    Anion gap 6 5 - 15 mmol/L    Glucose 144 (H) 65 - 100 mg/dL    BUN 13 6 - 20 MG/DL    Creatinine 0.74 0.55 - 1.02 MG/DL    BUN/Creatinine ratio 18 12 - 20      GFR est AA >60 >60 ml/min/1.73m2    GFR est non-AA >60 >60 ml/min/1.73m2    Calcium 8.5 8.5 - 10.1 MG/DL    Bilirubin, total 0.2 0.2 - 1.0 MG/DL    ALT (SGPT) 50 12 - 78 U/L    AST (SGOT) 46 (H) 15 - 37 U/L    Alk.  phosphatase 79 45 - 117 U/L    Protein, total 6.9 6.4 - 8.2 g/dL    Albumin 3.3 (L) 3.5 - 5.0 g/dL    Globulin 3.6 2.0 - 4.0 g/dL    A-G Ratio 0.9 (L) 1.1 - 2.2     URINALYSIS W/ REFLEX CULTURE    Specimen: Urine   Result Value Ref Range    Color YELLOW/STRAW      Appearance HAZY (A) CLEAR      Specific gravity 1.020 1.003 - 1.030      pH (UA) 6.0 5.0 - 8.0      Protein NEGATIVE  NEG mg/dL    Glucose NEGATIVE  NEG mg/dL    Ketone NEGATIVE  NEG mg/dL    Bilirubin NEGATIVE  NEG      Blood SMALL (A) NEG      Urobilinogen 0.2 0.2 - 1.0 EU/dL    Nitrites NEGATIVE  NEG      Leukocyte Esterase MODERATE (A) NEG      WBC 10-20 0 - 4 /hpf    RBC 5-10 0 - 5 /hpf    Epithelial cells MANY (A) FEW /lpf    Bacteria 2+ (A) NEG /hpf    UA:UC IF INDICATED URINE CULTURE ORDERED (A) CNI      Other: Renal Epithelial cells Present     TROPONIN I   Result Value Ref Range    Troponin-I, Qt. <0.04 <0.05 ng/mL   EKG, 12 LEAD, INITIAL   Result Value Ref Range    Ventricular Rate 73 BPM    Atrial Rate 73 BPM    P-R Interval 170 ms    QRS Duration 138 ms    Q-T Interval 422 ms    QTC Calculation (Bezet) 464 ms    Calculated P Axis 64 degrees    Calculated R Axis -65 degrees    Calculated T Axis 15 degrees    Diagnosis       Normal sinus rhythm  Right bundle branch block  Left anterior fascicular block  ** Bifascicular block **  Minimal voltage criteria for LVH, may be normal variant  When compared with ECG of 18-NOV-2017 12:26,  No significant change was found  Confirmed by Aaron Cabrera ([id number]) on 12/5/2017 9:23:14 PM  Also confirmed by Inder Edmond ([id number])  on 12/5/2017 9:42:52 PM         Imaging review:  2/17/2018  MRI brain without contrast  Chronic left pontine lacunar infarction    MRA neck without contrast  Normal    Documentation review:  None    Assessment/Plan:   Kurt Hong is a 70 y.o. female who presented to the neurology office for right occipital neuralgia. Pt does not want occipital nerve block. Pt is on naproxen and that is helping. Will continue. She states there is a spot in the head that needs to be checked out. I recommend that she sees a dermatologist.     Pt did have a left pontine infarct. Cont aspirin 81 mg a day and rosuvastatin 10 mg daily. She does have mild parkinsonian features and will continue to observe. Fu 6 months. 3 most recent PHQ Screens 1/29/2019   Little interest or pleasure in doing things Several days   Feeling down, depressed, irritable, or hopeless Several days   Total Score PHQ 2 2     Primary care to address possible depression if PHQ-9 score is more than 9. ICD-10-CM ICD-9-CM    1. Cerebrovascular accident (CVA), unspecified mechanism ([facility] [address]  I63.9 434.91    2. Occipital neuralgia of right side  M54.81 723.8    3. Mixed hyperlipidemia  E78.2 272.2    4.  Essential hypertension  I10 401.9         Cuauhtemoc Chase MD  Neurologist    CC: Leonor Bronw MD  Fax: [phone]    This note was created using voice recognition software. Despite editing, there may be syntax errors.

## 2020-10-20 ENCOUNTER — APPOINTMENT (OUTPATIENT)
Dept: GENERAL RADIOLOGY | Age: 75
End: 2020-10-20
Attending: EMERGENCY MEDICINE
Payer: MEDICARE

## 2020-10-20 ENCOUNTER — HOSPITAL ENCOUNTER (EMERGENCY)
Age: 75
Discharge: HOME | End: 2020-10-20
Attending: EMERGENCY MEDICINE
Payer: MEDICARE

## 2020-10-20 VITALS
OXYGEN SATURATION: 98 % | WEIGHT: 164.24 LBS | BODY MASS INDEX: 28.04 KG/M2 | SYSTOLIC BLOOD PRESSURE: 149 MMHG | TEMPERATURE: 97.7 F | HEIGHT: 64 IN | HEART RATE: 74 BPM | DIASTOLIC BLOOD PRESSURE: 89 MMHG | RESPIRATION RATE: 16 BRPM

## 2020-10-20 DIAGNOSIS — M51.36 DDD (DEGENERATIVE DISC DISEASE), LUMBAR: ICD-10-CM

## 2020-10-20 DIAGNOSIS — M54.50 ACUTE BILATERAL LOW BACK PAIN WITHOUT SCIATICA: Primary | ICD-10-CM

## 2020-10-20 LAB
APPEARANCE UR: CLEAR
BACTERIA URNS QL MICRO: NEGATIVE /HPF
BILIRUB UR QL: NEGATIVE
COLOR UR: NORMAL
EPITH CASTS URNS QL MICRO: NORMAL /LPF
GLUCOSE UR STRIP.AUTO-MCNC: NEGATIVE MG/DL
HGB UR QL STRIP: NEGATIVE
KETONES UR QL STRIP.AUTO: NEGATIVE MG/DL
LEUKOCYTE ESTERASE UR QL STRIP.AUTO: NEGATIVE
NITRITE UR QL STRIP.AUTO: NEGATIVE
PH UR STRIP: 8 [PH] (ref 5–8)
PROT UR STRIP-MCNC: NEGATIVE MG/DL
RBC #/AREA URNS HPF: NORMAL /HPF (ref 0–5)
SP GR UR REFRACTOMETRY: 1.01 (ref 1–1.03)
UROBILINOGEN UR QL STRIP.AUTO: 0.2 EU/DL (ref 0.2–1)
WBC URNS QL MICRO: NORMAL /HPF (ref 0–4)

## 2020-10-20 PROCEDURE — 99283 EMERGENCY DEPT VISIT LOW MDM: CPT

## 2020-10-20 PROCEDURE — 81001 URINALYSIS AUTO W/SCOPE: CPT

## 2020-10-20 PROCEDURE — 72100 X-RAY EXAM L-S SPINE 2/3 VWS: CPT

## 2020-10-20 RX ORDER — IBUPROFEN 600 MG/1
600 TABLET ORAL
Qty: 20 TAB | Refills: 0 | Status: SHIPPED | OUTPATIENT
Start: 2020-10-20

## 2020-10-20 RX ORDER — HYDROCODONE BITARTRATE AND ACETAMINOPHEN 5; 325 MG/1; MG/1
1 TABLET ORAL
Qty: 9 TAB | Refills: 0 | Status: SHIPPED | OUTPATIENT
Start: 2020-10-20 | End: 2020-10-23

## 2020-10-20 NOTE — ED NOTES
Pt states lower back pain not relieved by OTC medications. Pt able to ambulate with out difficulty. Contacted XR to obtain films.

## 2020-10-20 NOTE — ED PROVIDER NOTES
EMERGENCY DEPARTMENT HISTORY AND PHYSICAL EXAM      Date: 10/20/2020  Patient Name: Chucho Pearl    History of Presenting Illness     Chief Complaint   Patient presents with    Back Pain     Pain at left low back since yesterday morning after getting OOB. Now radiating to upper back and abdomen       History Provided By: Patient    HPI: Chucho Pearl, 70 y.o. female with history of arthritis, CVA and hypertension presents ambulatory with a cane to the ED with cc of day or so of AF 10 constant, achy lower back pain that is worse with movement. She tells me her symptoms started upon getting out of bed yesterday morning. She admits to having arthritis in her hips and knees and is unaware of any problems in her lower back. She denies saddle anesthesia, abdominal pain, bowel or bladder symptoms, radiating pain, weakness, numbness or fever. There are no other complaints, changes, or physical findings at this time. PCP: Francina Closs, MD    Current Outpatient Medications   Medication Sig Dispense Refill    HYDROcodone-acetaminophen (NORCO) 5-325 mg per tablet Take 1 Tab by mouth every eight (8) hours as needed for Pain for up to 3 days. Max Daily Amount: 3 Tabs. 9 Tab 0    ibuprofen (MOTRIN) 600 mg tablet Take 1 Tab by mouth every six (6) hours as needed for Pain. 20 Tab 0    rosuvastatin (CRESTOR) 10 mg tablet take 1 tablet by mouth once daily  0    ascorbic acid, vitamin C, (VITAMIN C) 500 mg tablet Take  by mouth daily.  cholecalciferol (VITAMIN D3) 400 unit tab tablet Take  by mouth daily.  docusate sodium (COLACE) 100 mg capsule Take 100 mg by mouth two (2) times a day.  multivitamin (ONE A DAY) tablet Take 1 Tab by mouth daily.  clonazePAM (KLONOPIN) 0.5 mg tablet Take  by mouth nightly as needed.  meclizine (ANTIVERT) 25 mg tablet Take 1 Tab by mouth three (3) times daily as needed for Dizziness. 20 Tab 0    aspirin 81 mg tablet Take 81 mg by mouth daily.       diphenhydrAMINE (BENADRYL ALLERGY) 25 mg tablet Take 25 mg by mouth nightly.  peg 400-propylene glycol (SYSTANE, PROPYLENE GLYCOL,) 0.4-0.3 % drop Administer  to left eye as needed.  ondansetron (ZOFRAN ODT) 4 mg disintegrating tablet Take 1 Tab by mouth every eight (8) hours as needed for Nausea. 10 Tab 0     Past History     Past Medical History:  Past Medical History:   Diagnosis Date    Arthritis     Cancer [address] [address]     vulva    Cerebral artery occlusion with cerebral infarction ([facility] [address]     Hypertension     Shoulder fracture, left        Past Surgical History:  Past Surgical History:   Procedure Laterality Date    HX CHOLECYSTECTOMY      HX GYN         Family History:  No family history on file. Social History:  Social History     Tobacco Use    Smoking status: Never Smoker    Smokeless tobacco: Never Used   Substance Use Topics    Alcohol use: No    Drug use: No       Allergies: Allergies   Allergen Reactions    Pcn [Penicillins] Hives    Tape [Adhesive] Other (comments)     Tears skin, redness     Review of Systems   Review of Systems   Constitutional: Negative for fatigue and fever. HENT: Negative for ear pain and sore throat. Eyes: Negative for pain, redness and visual disturbance. Respiratory: Negative for cough and shortness of breath. Cardiovascular: Negative for chest pain and palpitations. Gastrointestinal: Negative for abdominal pain, nausea and vomiting. Genitourinary: Negative for dysuria, frequency and urgency. Musculoskeletal: Positive for back pain. Negative for gait problem, neck pain and neck stiffness. Skin: Negative for rash and wound. Neurological: Negative for dizziness, weakness, light-headedness, numbness and headaches. Physical Exam   Physical Exam  Vitals signs and nursing note reviewed. Constitutional:       General: She is not in acute distress. Appearance: She is well-developed. She is not toxic-appearing.    HENT:      Head: Normocephalic and atraumatic. Jaw: No trismus. Right Ear: External ear normal.      Left Ear: External ear normal.      Nose: Nose normal.      Mouth/Throat:      Pharynx: Uvula midline. Eyes:      General: No scleral icterus. Conjunctiva/sclera: Conjunctivae normal.      Pupils: Pupils are equal, round, and reactive to light. Neck:      Musculoskeletal: Full passive range of motion without pain and normal range of motion. Cardiovascular:      Rate and Rhythm: Normal rate and regular rhythm. Pulmonary:      Effort: Pulmonary effort is normal. No tachypnea, accessory muscle usage or respiratory distress. Breath sounds: No decreased breath sounds or wheezing. Abdominal:      Palpations: Abdomen is soft. Tenderness: There is no abdominal tenderness. Musculoskeletal: Normal range of motion. Lumbar back: She exhibits tenderness. Back:       Comments:   Ambulatory with a cane with a steady gait and no limp or list.    LOWER BACK:  No bruising, redness or swelling. No step off. Diffuse muscular discomfort. No CVA tenderness   Skin:     Findings: No rash. Neurological:      Mental Status: She is alert and oriented to person, place, and time. She is not disoriented. GCS: GCS eye subscore is 4. GCS verbal subscore is 5. GCS motor subscore is 6. Cranial Nerves: No cranial nerve deficit.    Psychiatric:         Speech: Speech normal.       Diagnostic Study Results     Labs -     Recent Results (from the past 12 hour(s))   URINALYSIS W/MICROSCOPIC    Collection Time: 10/20/20  4:20 PM   Result Value Ref Range    Color YELLOW/STRAW      Appearance CLEAR CLEAR      Specific gravity 1.015 1.003 - 1.030      pH (UA) 8.0 5.0 - 8.0      Protein Negative NEG mg/dL    Glucose Negative NEG mg/dL    Ketone Negative NEG mg/dL    Bilirubin Negative NEG      Blood Negative NEG      Urobilinogen 0.2 0.2 - 1.0 EU/dL    Nitrites Negative NEG      Leukocyte Esterase Negative NEG WBC 0-4 0 - 4 /hpf    RBC 0-5 0 - 5 /hpf    Epithelial cells FEW FEW /lpf    Bacteria Negative NEG /hpf       Radiologic Studies -   XR SPINE LUMB 2 OR 3 V   Final Result   IMPRESSION: No acute findings. Mild degenerative spine changes and osteopenia. CT Results  (Last 48 hours)    None        CXR Results  (Last 48 hours)    None        Medical Decision Making   I am the first provider for this patient. I reviewed the vital signs, available nursing notes, past medical history, past surgical history, family history and social history. Vital Signs-Reviewed the patient's vital signs. Patient Vitals for the past 12 hrs:   Temp Pulse Resp BP SpO2   10/20/20 1432 97.7 °F (36.5 °C) 74 16 (!) 149/89 98 %       Pulse Oximetry Analysis - 98% on RA    Records Reviewed: Nursing Notes, Old Medical Records, Previous Radiology Studies, Previous Laboratory Studies and     Provider Notes (Medical Decision Making):   DDx: Compression fracture, HNP, DDD, lumbar strain, UTI    Afebrile; well-appearing; reassuring exam; plain films are negative for compression fracture; UA is negative for UTI; will offer pain medication and referral if symptoms persist or recur    ED Course:   Initial assessment performed. The patients presenting problems have been discussed, and they are in agreement with the care plan formulated and outlined with them. I have encouraged them to ask questions as they arise throughout their visit. Disposition:  Discharge    PLAN:  1. Discharge Medication List as of 10/20/2020  5:56 PM      START taking these medications    Details   HYDROcodone-acetaminophen (NORCO) 5-325 mg per tablet Take 1 Tab by mouth every eight (8) hours as needed for Pain for up to 3 days.  Max Daily Amount: 3 Tabs., Normal, Disp-9 Tab,R-0      ibuprofen (MOTRIN) 600 mg tablet Take 1 Tab by mouth every six (6) hours as needed for Pain., Normal, Disp-20 Tab,R-0         CONTINUE these medications which have NOT CHANGED Details   rosuvastatin (CRESTOR) 10 mg tablet take 1 tablet by mouth once daily, Historical Med, R-0      ascorbic acid, vitamin C, (VITAMIN C) 500 mg tablet Take  by mouth daily. , Historical Med      cholecalciferol (VITAMIN D3) 400 unit tab tablet Take  by mouth daily. , Historical Med      docusate sodium (COLACE) 100 mg capsule Take 100 mg by mouth two (2) times a day., Historical Med      multivitamin (ONE A DAY) tablet Take 1 Tab by mouth daily. , Historical Med      clonazePAM (KLONOPIN) 0.5 mg tablet Take  by mouth nightly as needed., Historical Med      meclizine (ANTIVERT) 25 mg tablet Take 1 Tab by mouth three (3) times daily as needed for Dizziness. , Print, Disp-20 Tab, R-0      aspirin 81 mg tablet Take 81 mg by mouth daily. Historical Med, 81 mg      diphenhydrAMINE (BENADRYL ALLERGY) 25 mg tablet Take 25 mg by mouth nightly., Historical Med      peg 400-propylene glycol (SYSTANE, PROPYLENE GLYCOL,) 0.4-0.3 % drop Administer  to left eye as needed., Historical Med      ondansetron (ZOFRAN ODT) 4 mg disintegrating tablet Take 1 Tab by mouth every eight (8) hours as needed for Nausea. , Print, Disp-10 Tab, R-0           2. Follow-up Information     Follow up With Specialties Details Why Contact Info    Molly Gu MD Orthopedic Surgery Schedule an appointment as soon as possible for a visit Miguel Garcia / SPINE: as needed if symptoms persist [facility]  [address]  [address] [id number] [phone]          Return to ED if worse     Diagnosis     Clinical Impression:   1. Acute bilateral low back pain without sciatica    2.  DDD (degenerative disc disease), lumbar

## 2021-01-21 ENCOUNTER — OFFICE VISIT (OUTPATIENT)
Dept: NEUROLOGY | Age: 76
End: 2021-01-21
Payer: MEDICARE

## 2021-01-21 VITALS
WEIGHT: 164 LBS | RESPIRATION RATE: 16 BRPM | HEIGHT: 64 IN | BODY MASS INDEX: 28 KG/M2 | SYSTOLIC BLOOD PRESSURE: 134 MMHG | DIASTOLIC BLOOD PRESSURE: 82 MMHG

## 2021-01-21 DIAGNOSIS — M54.81 OCCIPITAL NEURALGIA OF RIGHT SIDE: ICD-10-CM

## 2021-01-21 DIAGNOSIS — I63.9 CEREBROVASCULAR ACCIDENT (CVA), UNSPECIFIED MECHANISM (HCC): Primary | ICD-10-CM

## 2021-01-21 DIAGNOSIS — R20.0 NUMBNESS OF LEFT FOOT: ICD-10-CM

## 2021-01-21 DIAGNOSIS — I10 ESSENTIAL HYPERTENSION: ICD-10-CM

## 2021-01-21 DIAGNOSIS — E78.2 MIXED HYPERLIPIDEMIA: ICD-10-CM

## 2021-01-21 PROCEDURE — G8754 DIAS BP LESS 90: HCPCS | Performed by: PSYCHIATRY & NEUROLOGY

## 2021-01-21 PROCEDURE — G8419 CALC BMI OUT NRM PARAM NOF/U: HCPCS | Performed by: PSYCHIATRY & NEUROLOGY

## 2021-01-21 PROCEDURE — G8752 SYS BP LESS 140: HCPCS | Performed by: PSYCHIATRY & NEUROLOGY

## 2021-01-21 PROCEDURE — 3017F COLORECTAL CA SCREEN DOC REV: CPT | Performed by: PSYCHIATRY & NEUROLOGY

## 2021-01-21 PROCEDURE — G8432 DEP SCR NOT DOC, RNG: HCPCS | Performed by: PSYCHIATRY & NEUROLOGY

## 2021-01-21 PROCEDURE — 1101F PT FALLS ASSESS-DOCD LE1/YR: CPT | Performed by: PSYCHIATRY & NEUROLOGY

## 2021-01-21 PROCEDURE — 99214 OFFICE O/P EST MOD 30 MIN: CPT | Performed by: PSYCHIATRY & NEUROLOGY

## 2021-01-21 PROCEDURE — 1090F PRES/ABSN URINE INCON ASSESS: CPT | Performed by: PSYCHIATRY & NEUROLOGY

## 2021-01-21 PROCEDURE — G8536 NO DOC ELDER MAL SCRN: HCPCS | Performed by: PSYCHIATRY & NEUROLOGY

## 2021-01-21 PROCEDURE — G8400 PT W/DXA NO RESULTS DOC: HCPCS | Performed by: PSYCHIATRY & NEUROLOGY

## 2021-01-21 PROCEDURE — G8428 CUR MEDS NOT DOCUMENT: HCPCS | Performed by: PSYCHIATRY & NEUROLOGY

## 2021-01-21 NOTE — PROGRESS NOTES
Neurology follow-up note    Chief Complaint   Patient presents with    Numbness     left leg from knee to foot     Extremity Weakness     BLE       Kyle Gage is a 70 y.o. female who presented to the neurology office for management of headaches. She started having her headaches after a fall in August 2018. She fell backwards. She has been having a right occipital headaches and radiates to the right frontal area. It is a sharp pain. No nausea, vomiting, photo or phonophobia. She has headaches everyday. She takes meclizine for headaches. Interval Hx:  -States that her headache is getting better. She complains of a tingling sensation on the lateral aspect of the left knee that radiates to her foot. She also complains of back pain and intermittent weakness of the left leg. Current Outpatient Medications   Medication Sig    ibuprofen (MOTRIN) 600 mg tablet Take 1 Tab by mouth every six (6) hours as needed for Pain.  rosuvastatin (CRESTOR) 10 mg tablet take 1 tablet by mouth once daily    ascorbic acid, vitamin C, (VITAMIN C) 500 mg tablet Take  by mouth daily.  diphenhydrAMINE (BENADRYL ALLERGY) 25 mg tablet Take 25 mg by mouth nightly.  cholecalciferol (VITAMIN D3) 400 unit tab tablet Take  by mouth daily.  docusate sodium (COLACE) 100 mg capsule Take 100 mg by mouth two (2) times a day.  peg 400-propylene glycol (SYSTANE, PROPYLENE GLYCOL,) 0.4-0.3 % drop Administer  to left eye as needed.  multivitamin (ONE A DAY) tablet Take 1 Tab by mouth daily.  clonazePAM (KLONOPIN) 0.5 mg tablet Take  by mouth nightly as needed.  meclizine (ANTIVERT) 25 mg tablet Take 1 Tab by mouth three (3) times daily as needed for Dizziness.  aspirin 81 mg tablet Take 81 mg by mouth daily.  ondansetron (ZOFRAN ODT) 4 mg disintegrating tablet Take 1 Tab by mouth every eight (8) hours as needed for Nausea. No current facility-administered medications for this visit. EXAMINATION:   Visit Vitals  /82 (BP 1 Location: Right arm, BP Patient Position: Sitting)   Resp 16   Ht 5' 4\" (1.626 m)   Wt 164 lb (74.4 kg)   BMI 28.15 kg/m²        General:   General appearance: Pt is in no acute distress   Distal pulses are preserved    Neurological Examination:   Mental Status: AAO x3. Speech is fluent. Follows commands, has normal fund of knowledge, attention, short term recall, comprehension and insight. Cranial Nerves: Visual fields are full. PERRL, Extraocular movements are full. Facial sensation intact V1- V3. Facial movement intact, symmetric. Hearing intact to conversation. Palate elevates symmetrically. Shoulder shrug symmetric. Tongue midline. Motor: Strength is 5/5 in all 4 ext. Normal tone. No atrophy. Sensation: Light touch - Normal    Coordination/Cerebellar: Intact to finger-nose-finger     Mild bradykinesia and cog wheel rigidity      Laboratory review:   Results for orders placed or performed during the hospital encounter of 10/20/20   URINALYSIS W/MICROSCOPIC   Result Value Ref Range    Color YELLOW/STRAW      Appearance CLEAR CLEAR      Specific gravity 1.015 1.003 - 1.030      pH (UA) 8.0 5.0 - 8.0      Protein Negative NEG mg/dL    Glucose Negative NEG mg/dL    Ketone Negative NEG mg/dL    Bilirubin Negative NEG      Blood Negative NEG      Urobilinogen 0.2 0.2 - 1.0 EU/dL    Nitrites Negative NEG      Leukocyte Esterase Negative NEG      WBC 0-4 0 - 4 /hpf    RBC 0-5 0 - 5 /hpf    Epithelial cells FEW FEW /lpf    Bacteria Negative NEG /hpf       Imaging review:  2/17/2018  MRI brain without contrast  Chronic left pontine lacunar infarction    MRA neck without contrast  Normal    Documentation review:  None    Assessment/Plan:   Aubrey Escalante is a 70 y.o. female who presented to the neurology office for right occipital neuralgia. Stable. Pt did have a left pontine infarct. Cont aspirin 81 mg a day and rosuvastatin 10 mg daily.      She does have mild parkinsonian features and will continue to observe. She is also complaining of left-sided back pain. She does also have pain on the lateral aspect of the left knee and states that a tingling sensation goes down towards her foot. We will proceed with EMG/nerve conduction study. Fu 6 months. 3 most recent PHQ Screens 1/29/2019   Little interest or pleasure in doing things Several days   Feeling down, depressed, irritable, or hopeless Several days   Total Score PHQ 2 2     Primary care to address possible depression if PHQ-9 score is more than 9. ICD-10-CM ICD-9-CM    1. Cerebrovascular accident (CVA), unspecified mechanism ([facility] [address]  I63.9 434.91    2. Occipital neuralgia of right side  M54.81 723.8    3. Mixed hyperlipidemia  E78.2 272.2    4. Essential hypertension  I10 401.9         Trent Aguilar MD  Neurologist    CC: Shady Caballero MD  Fax: [phone]    This note was created using voice recognition software. Despite editing, there may be syntax errors.

## 2021-01-21 NOTE — PROGRESS NOTES
Chief Complaint   Patient presents with    Numbness     left leg from knee to foot     Extremity Weakness     BLE

## 2021-04-08 ENCOUNTER — OFFICE VISIT (OUTPATIENT)
Dept: NEUROLOGY | Age: 76
End: 2021-04-08
Payer: MEDICARE

## 2021-04-08 DIAGNOSIS — R20.0 NUMBNESS OF LEFT FOOT: Primary | ICD-10-CM

## 2021-04-08 PROCEDURE — 95886 MUSC TEST DONE W/N TEST COMP: CPT | Performed by: PSYCHIATRY & NEUROLOGY

## 2021-04-08 PROCEDURE — 95908 NRV CNDJ TST 3-4 STUDIES: CPT | Performed by: PSYCHIATRY & NEUROLOGY

## 2021-04-08 NOTE — PROCEDURES
ELECTRODIAGNOSTIC REPORT      Test Date:  2021    Patient: Zoe Love : 1945 Physician: Victor Manuel Mayfield M.D.   ID#: [id number] SEX: Female Ref. Phys: Victor Manuel Mayfield M.D. Patient History / Exam:  Kathyleen Closs 70 y.o. female presents with left foot numbness. Query left lumbar radiculopathy    EMG & NCV Findings:  Evaluation of the left Fibular motor nerve showed normal distal onset latency (3.4 ms), normal amplitude (6.4 mV), normal conduction velocity (B Fib-Ankle, 48 m/s), and normal conduction velocity (Poplt-B Fib, 53 m/s). The left tibial motor nerve showed normal distal onset latency (2.9 ms), normal amplitude (10.9 mV), and normal conduction velocity (Knee-Ankle, 50 m/s). The left Sup Fibular sensory nerve showed normal distal peak latency (2.7 ms), normal amplitude (12.3 µV), and normal conduction velocity (Lower leg-Lat ankle, 56 m/s). The left sural sensory nerve showed normal distal peak latency (3.0 ms) and normal amplitude (5.0 µV). All examined muscles (as indicated in the following table) showed no evidence of electrical instability. Impression: This is a normal study. There is no electrophysiological evidence of a left lumbar radiculopathy or peripheral neuropathy        ___________________________  S.  Johny Gruber M.D.    Nerve Conduction Studies  Anti Sensory Summary Table     Stim Site NR Onset (ms) Peak (ms) O-P Amp (µV) Norm Peak (ms) Norm O-P Amp Site1 Site2 Dist (cm) Norm Jeffery (m/s)   Left Sup Fibular Anti Sensory (Lat ankle)   Lower leg    1.8 2.7 12.3 <4.4 >5.0 Lower leg Lat ankle 10.0 >32   Left Sural Anti Sensory (Lat Mall)   Calf    2.0 3.0 5.0 <4.5 >4.0 Calf Lat Mall 14.0      Motor Summary Table     Stim Site NR Onset (ms) Norm Onset (ms) O-P Amp (mV) Norm O-P Amp P-T Amp (mV) Site1 Site2 Dist (cm) Jeffery (m/s)   Left Fibular Motor (Ext Dig Brev)   Ankle    3.4 <6.5 6.4 >1.1  Ankle Ext Dig Brev 8.0 24   B Fib    8.4  5.9   B Fib Ankle 24.0 48   Poplt    10.3  5.6   Poplt B Fib 10.0 53   Left Tibial Motor (Abd Turcios Brev)   Ankle    2.9 <6.1 10.9 >4.4  Ankle Abd Turcios Brev 8.0 28   Knee    10.3  9.5   Knee Ankle 37.0 50       EMG     Side Muscle Nerve Root Ins Act Fibs Psw Recrt Duration Amp Poly Comment   Left AntTibialis Dp Br Peron L4-5 Nml Nml Nml Nml Nml Nml Nml    Left MedGastroc Tibial S1-2 Nml Nml Nml Nml Nml Nml Nml    Left VastusLat Femoral L2-4 Nml Nml Nml Nml Nml Nml Nml    Left BicepsFemS Sciatic L5-S1 Nml Nml Nml Nml Nml Nml Nml    Left Tensor Fascia Rosa Sciatic L5 Nml Nml Nml Nml Nml Nml Nml      Waveforms:

## 2022-07-06 ENCOUNTER — OFFICE VISIT (OUTPATIENT)
Dept: NEUROLOGY | Age: 77
End: 2022-07-06
Payer: MEDICARE

## 2022-07-06 VITALS
SYSTOLIC BLOOD PRESSURE: 130 MMHG | WEIGHT: 158.9 LBS | HEIGHT: 64 IN | HEART RATE: 78 BPM | TEMPERATURE: 97.9 F | BODY MASS INDEX: 27.13 KG/M2 | DIASTOLIC BLOOD PRESSURE: 72 MMHG | RESPIRATION RATE: 18 BRPM | OXYGEN SATURATION: 98 %

## 2022-07-06 DIAGNOSIS — R42 DIZZINESS: ICD-10-CM

## 2022-07-06 DIAGNOSIS — R51.9 HEADACHE DISORDER: ICD-10-CM

## 2022-07-06 DIAGNOSIS — R29.898 WEAKNESS OF BOTH LEGS: ICD-10-CM

## 2022-07-06 DIAGNOSIS — R20.2 PARESTHESIA: ICD-10-CM

## 2022-07-06 DIAGNOSIS — I63.9 CEREBROVASCULAR ACCIDENT (CVA), UNSPECIFIED MECHANISM (HCC): Primary | ICD-10-CM

## 2022-07-06 DIAGNOSIS — R26.9 GAIT DISORDER: ICD-10-CM

## 2022-07-06 DIAGNOSIS — H93.13 TINNITUS OF BOTH EARS: ICD-10-CM

## 2022-07-06 DIAGNOSIS — G62.9 NEUROPATHY: ICD-10-CM

## 2022-07-06 PROCEDURE — 1090F PRES/ABSN URINE INCON ASSESS: CPT | Performed by: PSYCHIATRY & NEUROLOGY

## 2022-07-06 PROCEDURE — 1123F ACP DISCUSS/DSCN MKR DOCD: CPT | Performed by: PSYCHIATRY & NEUROLOGY

## 2022-07-06 PROCEDURE — G8536 NO DOC ELDER MAL SCRN: HCPCS | Performed by: PSYCHIATRY & NEUROLOGY

## 2022-07-06 PROCEDURE — G8427 DOCREV CUR MEDS BY ELIG CLIN: HCPCS | Performed by: PSYCHIATRY & NEUROLOGY

## 2022-07-06 PROCEDURE — G8417 CALC BMI ABV UP PARAM F/U: HCPCS | Performed by: PSYCHIATRY & NEUROLOGY

## 2022-07-06 PROCEDURE — 99215 OFFICE O/P EST HI 40 MIN: CPT | Performed by: PSYCHIATRY & NEUROLOGY

## 2022-07-06 PROCEDURE — G8510 SCR DEP NEG, NO PLAN REQD: HCPCS | Performed by: PSYCHIATRY & NEUROLOGY

## 2022-07-06 PROCEDURE — G8400 PT W/DXA NO RESULTS DOC: HCPCS | Performed by: PSYCHIATRY & NEUROLOGY

## 2022-07-06 PROCEDURE — 1101F PT FALLS ASSESS-DOCD LE1/YR: CPT | Performed by: PSYCHIATRY & NEUROLOGY

## 2022-07-06 PROCEDURE — 3017F COLORECTAL CA SCREEN DOC REV: CPT | Performed by: PSYCHIATRY & NEUROLOGY

## 2022-07-06 RX ORDER — OMEPRAZOLE 40 MG/1
1 CAPSULE, DELAYED RELEASE ORAL DAILY
COMMUNITY

## 2022-07-06 RX ORDER — GABAPENTIN 100 MG/1
100 CAPSULE ORAL 3 TIMES DAILY
Qty: 90 CAPSULE | Refills: 2 | Status: SHIPPED | OUTPATIENT
Start: 2022-07-06

## 2022-07-06 NOTE — PROGRESS NOTES
Neurology Consult Note      HISTORY PROVIDED BY: patient    Chief Complaint:   Chief Complaint   Patient presents with    Follow-up     former patient of Dr Kayleigh Gandhi - s/p CVA-       Subjective:    Eve Mcnamara is a 78 y.o. right handed female who presents in office for continuation of care for CVA  This is a 72-year-old right-handed female with history of degenerative joint disease, vulvar cancer, CVA, hypertension, left shoulder fracture, who is a former patient of Dr. Kt Delgadillo, currently establishing care with me. Patient today says she has been experiencing headache or head pain mostly on the right side of the head. It is throbbing in nature, occasional sharp pain. She experiences ringing in the ear. No stated nausea or vomiting, no photophobia phonophobia. She says her legs are weak, sometimes she feels coldness going up to the legs, she has been having numbness and tingling sensation of the legs, difficulty walking. She denies recent fall, loss of consciousness, dysphagia or odynophagia.   Review of Systems - General ROS: positive for  - fatigue, night sweats and sleep disturbance  Psychological ROS: positive for - anxiety and sleep disturbances  Ophthalmic ROS: positive for - blurry vision and decreased vision  ENT ROS: positive for - headaches, tinnitus, vertigo and visual changes  Allergy and Immunology ROS: negative  Hematological and Lymphatic ROS: negative  Endocrine ROS: negative  Respiratory ROS: no cough, shortness of breath, or wheezing  Cardiovascular ROS: no chest pain or dyspnea on exertion  Gastrointestinal ROS: no abdominal pain, change in bowel habits, or black or bloody stools  Genito-Urinary ROS: no dysuria, trouble voiding, or hematuria  Musculoskeletal ROS: positive for - gait disturbance, joint pain, joint stiffness, muscle pain and muscular weakness  Neurological ROS: positive for - dizziness, gait disturbance, headaches, impaired coordination/balance, numbness/tingling, visual changes and weakness  Dermatological ROS: negative  Past Medical History:   Diagnosis Date    Arthritis     Cancer ([facility] [address]     vulva    Cerebral artery occlusion with cerebral infarction ([facility] [address]     Hypertension     Shoulder fracture, left       Past Surgical History:   Procedure Laterality Date    HX CHOLECYSTECTOMY      HX GYN        Social History     Socioeconomic History    Marital status:      Spouse name: Not on file    Number of children: Not on file    Years of education: Not on file    Highest education level: Not on file   Occupational History    Not on file   Tobacco Use    Smoking status: Never Smoker    Smokeless tobacco: Never Used   Substance and Sexual Activity    Alcohol use: No    Drug use: No    Sexual activity: Not on file   Other Topics Concern    Not on file   Social History Narrative    Not on file     Social Determinants of Health     Financial Resource Strain:     Difficulty of Paying Living Expenses: Not on file   Food Insecurity:     Worried About Running Out of Food in the Last Year: Not on file    Genaro of Food in the Last Year: Not on file   Transportation Needs:     Lack of Transportation (Medical): Not on file    Lack of Transportation (Non-Medical):  Not on file   Physical Activity:     Days of Exercise per Week: Not on file    Minutes of Exercise per Session: Not on file   Stress:     Feeling of Stress : Not on file   Social Connections:     Frequency of Communication with Friends and Family: Not on file    Frequency of Social Gatherings with Friends and Family: Not on file    Attends Jewish Services: Not on file    Active Member of Clubs or Organizations: Not on file    Attends Club or Organization Meetings: Not on file    Marital Status: Not on file   Intimate Partner Violence:     Fear of Current or Ex-Partner: Not on file    Emotionally Abused: Not on file    Physically Abused: Not on file    Sexually Abused: Not on file   Housing Stability:  Unable to Pay for Housing in the Last Year: Not on file    Number of Places Lived in the Last Year: Not on file    Unstable Housing in the Last Year: Not on file     No family history on file. Objective:   ROS  As per HPI  Allergies   Allergen Reactions    Pcn [Penicillins] Hives    Tape [Adhesive] Other (comments)     Tears skin, redness        Meds:  Outpatient Medications Prior to Visit   Medication Sig Dispense Refill    omeprazole (PRILOSEC) 40 mg capsule Take 1 Capsule by mouth daily.  ibuprofen (MOTRIN) 600 mg tablet Take 1 Tab by mouth every six (6) hours as needed for Pain. 20 Tab 0    rosuvastatin (CRESTOR) 10 mg tablet take 1 tablet by mouth once daily  0    ascorbic acid, vitamin C, (VITAMIN C) 500 mg tablet Take  by mouth daily.  diphenhydrAMINE (BENADRYL ALLERGY) 25 mg tablet Take 25 mg by mouth nightly.  cholecalciferol (VITAMIN D3) 400 unit tab tablet Take  by mouth daily.  docusate sodium (COLACE) 100 mg capsule Take 100 mg by mouth two (2) times a day.  peg 400-propylene glycol (SYSTANE, PROPYLENE GLYCOL,) 0.4-0.3 % drop Administer  to left eye as needed.  multivitamin (ONE A DAY) tablet Take 1 Tab by mouth daily.  clonazePAM (KLONOPIN) 0.5 mg tablet Take  by mouth nightly as needed.  meclizine (ANTIVERT) 25 mg tablet Take 1 Tab by mouth three (3) times daily as needed for Dizziness. 20 Tab 0    aspirin 81 mg tablet Take 81 mg by mouth daily. No facility-administered medications prior to visit. Imaging:  MRI Results (most recent):  Results from Hospital Encounter encounter on 02/17/18    MRA NECK WO CONT    Narrative  EXAM:  MRA NECK WO CONT    INDICATION:  Stroke with right-sided weakness, remote    COMPARISON STUDY: None  Noncontrast 2-D time-of-flight MRA of the neck was performed. Multiplanar  reconstructions were obtained. FINDINGS:  .  The bilateral subclavian, common carotid, and internal carotid arteries are  patent with no flow-limiting stenosis. NASCET method was utilized for  calculating stenosis. The vertebral arteries are bilaterally patent and are  codominant. Impression  IMPRESSION:  Negative MRA of the neck     CT Results (most recent):  Results from Hospital Encounter encounter on 12/05/17    CTA CHEST W OR W WO CONT    Narrative  INDICATION: Intermittent chest pain, onset this morning, shortness of breath,  nausea. CT pulmonary angiogram is performed with 2.5 mm collimation. 100 mL of nonionic  IV Isovue-370 was administered for exam. 3D coronal and sagittal postprocessed  images were performed for this examination. CT dose reduction was achieved through use of a standardized protocol tailored  for this examination and automatic exposure control for dose modulation. The pulmonary arteries are well opacified and there is no evidence of pulmonary  embolism. The thoracic aorta is normal in course and caliber and there is no  aortic dissection. There is no axillary, mediastinal or hilar lymphadenopathy. The heart is normal in the size and there is no pericardial effusion. The  pleural spaces are normal. There are several bilateral punctate calcified  pulmonary granulomas. No pulmonary nodule or mass is visualized. There is very  mild bibasilar dependent atelectasis. The visualized upper abdominal structures  are normal.    Impression  IMPRESSION: No evidence pulmonary embolism.        Reviewed records in [facility] tab today    Lab Review   Results for orders placed or performed during the hospital encounter of 10/20/20   URINALYSIS W/MICROSCOPIC   Result Value Ref Range    Color YELLOW/STRAW      Appearance CLEAR CLEAR      Specific gravity 1.015 1.003 - 1.030      pH (UA) 8.0 5.0 - 8.0      Protein Negative NEG mg/dL    Glucose Negative NEG mg/dL    Ketone Negative NEG mg/dL    Bilirubin Negative NEG      Blood Negative NEG      Urobilinogen 0.2 0.2 - 1.0 EU/dL    Nitrites Negative NEG      Leukocyte Esterase Negative NEG      WBC 0-4 0 - 4 /hpf    RBC 0-5 0 - 5 /hpf    Epithelial cells FEW FEW /lpf    Bacteria Negative NEG /hpf        Exam:  Visit Vitals  /72 (BP 1 Location: Left arm, BP Patient Position: Sitting, BP Cuff Size: Large adult)   Pulse 78   Temp 97.9 °F (36.6 °C) (Oral)   Resp 18   Ht 5' 4\" (1.626 m)   Wt 158 lb 14.4 oz (72.1 kg)   SpO2 98%   BMI 27.28 kg/m²     General:  Alert, cooperative, no distress. Head:  Normocephalic, without obvious abnormality, atraumatic. Respiratory:  Heart:   Non labored breathing  Regular rate and rhythm, no murmurs   Neck:   2+ carotids, no bruits   Extremities: Warm, no cyanosis or edema. Pulses: 2+ radial pulses. Neurologic:  MS: Alert and oriented x 4, speech intact. Language intact, able to name, repeat, and follow all commands. Attention and fund of knowledge appropriate. Recent and remote memory intact. Cranial Nerves:  II: visual fields Full to confrontation   II: pupils Equal, round, reactive to light   II: optic disc No papilledema   III,VII: ptosis none   III,IV,VI: extraocular muscles  EOMI, no nystagmus or diplopia   V: facial light touch sensation  normal   VII: facial muscle function   symmetric   VIII: hearing intact   IX: soft palate elevation  normal   XI: trapezius strength  5/5   XI: sternocleidomastoid strength 5/5   XII: tongue  Midline     Motor: normal bulk and tone, no tremor              Strength: 4+/5 bilateral lower extremity, 5-/5 upper extremity , no PD  Sensory: Decreased sensation to LT, PP, temperature and vibration bilateral lower extremity up to the knee  Coordination: FTN and HTS intact, FABIANA intact,Romberg negative  Gait: Unsteady gait, unable to heel, toe, and tandem walk  Reflexes: 3+ symmetric  Plantar: Withdrawn         Assessment/Plan       ICD-10-CM ICD-9-CM    1.  Cerebrovascular accident (CVA), unspecified mechanism ([facility] [address]  I63.9 434.91 REFERRAL TO PHYSICAL THERAPY   2. Paresthesia  R20.2 782.0 gabapentin (NEURONTIN) 100 mg capsule   3. Headache disorder  R51.9 784.0    4. Dizziness  R42 780.4    5. Weakness of both legs  R29.898 729.89 REFERRAL TO PHYSICAL THERAPY   6. Tinnitus of both ears  H93.13 388.30    7. Gait disorder  R26.9 781.2 REFERRAL TO PHYSICAL THERAPY   8. Neuropathy  G62.9 355.9 gabapentin (NEURONTIN) 100 mg capsule   Plan:  Neurontin 100 mg p.o. 3 times daily  Referred to physical therapy    Follow-up and Dispositions    · Return in about 3 months (around 10/6/2022).              Signed:  Claudia Dillon MD  7/6/2022

## 2022-07-06 NOTE — PROGRESS NOTES
Chief Complaint   Patient presents with    Follow-up     former patient of Dr Evangelina Baca - s/p CVA-      1. Have you been to the ER, urgent care clinic since your last visit? Hospitalized since your last visit? Yes 3/27/22 [facility] in [address] [place] [address] for sinusitis     2. Have you seen or consulted any other health care providers outside of the [address] [place] [address] since your last visit? Include any pap smears or colon screening.   Yes Dr Gildardo Eisenberg

## 2022-08-26 ENCOUNTER — DOCUMENTATION ONLY (OUTPATIENT)
Dept: NEUROLOGY | Age: 77
End: 2022-08-26

## 2022-08-26 NOTE — PROGRESS NOTES
Faxed signed Physical Therapy Progress Note to [facility] at [phone] and received fax confirmation.

## 2022-09-30 ENCOUNTER — DOCUMENTATION ONLY (OUTPATIENT)
Dept: NEUROLOGY | Age: 77
End: 2022-09-30

## 2022-09-30 NOTE — PROGRESS NOTES
Faxed signed Physical Therapy Discharge Summary to [facility] at [phone] and received fax confirmation.

## 2022-10-19 ENCOUNTER — OFFICE VISIT (OUTPATIENT)
Dept: NEUROLOGY | Age: 77
End: 2022-10-19

## 2022-10-19 VITALS
RESPIRATION RATE: 16 BRPM | BODY MASS INDEX: 26.63 KG/M2 | SYSTOLIC BLOOD PRESSURE: 120 MMHG | TEMPERATURE: 98 F | HEIGHT: 64 IN | DIASTOLIC BLOOD PRESSURE: 72 MMHG | WEIGHT: 156 LBS | HEART RATE: 71 BPM | OXYGEN SATURATION: 98 %

## 2022-10-19 DIAGNOSIS — I63.9 CEREBROVASCULAR ACCIDENT (CVA), UNSPECIFIED MECHANISM (HCC): Primary | ICD-10-CM

## 2022-10-19 DIAGNOSIS — R20.0 NUMBNESS OF LEFT FOOT: ICD-10-CM

## 2022-10-19 DIAGNOSIS — R29.898 WEAKNESS OF BOTH LEGS: ICD-10-CM

## 2022-10-19 DIAGNOSIS — R20.2 PARESTHESIA: ICD-10-CM

## 2022-10-19 RX ORDER — BUTALBITAL, ACETAMINOPHEN AND CAFFEINE 50; 325; 40 MG/1; MG/1; MG/1
1 TABLET ORAL
COMMUNITY

## 2022-10-19 NOTE — PROGRESS NOTES
1. Have you been to the ER, urgent care clinic since your last visit? Hospitalized since your last visit? No.    2. Have you seen or consulted any other health care providers outside of the [address] [place] [address] since your last visit? Include any pap smears or colon screening.    No.        Chief Complaint   Patient presents with    Cerebrovascular Accident     3 month- did PT for weak legs and it did help

## 2023-01-22 NOTE — PROGRESS NOTES
Rendering provider is unavailable to complete documentation for this encounter. This note is being closed administratively.     Narciso Hays MD

## 2023-03-29 ENCOUNTER — APPOINTMENT (OUTPATIENT)
Dept: VASCULAR SURGERY | Age: 78
End: 2023-03-29
Attending: PSYCHIATRY & NEUROLOGY
Payer: MEDICARE

## 2023-03-29 ENCOUNTER — APPOINTMENT (OUTPATIENT)
Dept: CT IMAGING | Age: 78
End: 2023-03-29
Attending: EMERGENCY MEDICINE
Payer: MEDICARE

## 2023-03-29 ENCOUNTER — APPOINTMENT (OUTPATIENT)
Dept: MRI IMAGING | Age: 78
End: 2023-03-29
Attending: STUDENT IN AN ORGANIZED HEALTH CARE EDUCATION/TRAINING PROGRAM
Payer: MEDICARE

## 2023-03-29 ENCOUNTER — HOSPITAL ENCOUNTER (INPATIENT)
Age: 78
LOS: 1 days | Discharge: HOME HEALTH | End: 2023-03-30
Attending: EMERGENCY MEDICINE | Admitting: STUDENT IN AN ORGANIZED HEALTH CARE EDUCATION/TRAINING PROGRAM
Payer: MEDICARE

## 2023-03-29 ENCOUNTER — APPOINTMENT (OUTPATIENT)
Dept: GENERAL RADIOLOGY | Age: 78
End: 2023-03-29
Attending: EMERGENCY MEDICINE
Payer: MEDICARE

## 2023-03-29 DIAGNOSIS — R41.82 ALTERED MENTAL STATUS, UNSPECIFIED ALTERED MENTAL STATUS TYPE: Primary | ICD-10-CM

## 2023-03-29 PROBLEM — R41.3 AMNESIA: Status: ACTIVE | Noted: 2023-03-29

## 2023-03-29 PROBLEM — F02.80 LATE ONSET ALZHEIMER'S DISEASE WITHOUT BEHAVIORAL DISTURBANCE (HCC): Status: ACTIVE | Noted: 2023-03-29

## 2023-03-29 PROBLEM — I65.23 BILATERAL CAROTID ARTERY STENOSIS: Status: ACTIVE | Noted: 2023-03-29

## 2023-03-29 PROBLEM — G45.1 TRANSIENT ISCHEMIC ATTACK INVOLVING LEFT INTERNAL CAROTID ARTERY: Status: ACTIVE | Noted: 2023-03-29

## 2023-03-29 PROBLEM — R41.3 DISTURBANCE OF MEMORY: Status: ACTIVE | Noted: 2023-03-29

## 2023-03-29 PROBLEM — G30.1 LATE ONSET ALZHEIMER'S DISEASE WITHOUT BEHAVIORAL DISTURBANCE (HCC): Status: ACTIVE | Noted: 2023-03-29

## 2023-03-29 LAB
ALBUMIN SERPL-MCNC: 3.7 G/DL (ref 3.5–5)
ALBUMIN/GLOB SERPL: 1 (ref 1.1–2.2)
ALP SERPL-CCNC: 66 U/L (ref 45–117)
ALT SERPL-CCNC: 22 U/L (ref 12–78)
AMMONIA PLAS-SCNC: 13 UMOL/L
ANION GAP SERPL CALC-SCNC: 3 MMOL/L (ref 5–15)
APPEARANCE UR: CLEAR
AST SERPL-CCNC: 16 U/L (ref 15–37)
ATRIAL RATE: 75 BPM
BACTERIA URNS QL MICRO: ABNORMAL /HPF
BASOPHILS # BLD: 0.1 K/UL (ref 0–0.1)
BASOPHILS NFR BLD: 1 % (ref 0–1)
BILIRUB SERPL-MCNC: 0.3 MG/DL (ref 0.2–1)
BILIRUB UR QL: NEGATIVE
BUN SERPL-MCNC: 17 MG/DL (ref 6–20)
BUN/CREAT SERPL: 23 (ref 12–20)
CALCIUM SERPL-MCNC: 9.3 MG/DL (ref 8.5–10.1)
CALCULATED P AXIS, ECG09: 66 DEGREES
CALCULATED R AXIS, ECG10: -61 DEGREES
CALCULATED T AXIS, ECG11: 19 DEGREES
CHLORIDE SERPL-SCNC: 108 MMOL/L (ref 97–108)
CO2 SERPL-SCNC: 31 MMOL/L (ref 21–32)
COLOR UR: ABNORMAL
CREAT SERPL-MCNC: 0.73 MG/DL (ref 0.55–1.02)
DIAGNOSIS, 93000: NORMAL
DIFFERENTIAL METHOD BLD: NORMAL
EOSINOPHIL # BLD: 0.1 K/UL (ref 0–0.4)
EOSINOPHIL NFR BLD: 1 % (ref 0–7)
EPITH CASTS URNS QL MICRO: ABNORMAL /LPF
ERYTHROCYTE [DISTWIDTH] IN BLOOD BY AUTOMATED COUNT: 12.8 % (ref 11.5–14.5)
GLOBULIN SER CALC-MCNC: 3.7 G/DL (ref 2–4)
GLUCOSE BLD STRIP.AUTO-MCNC: 148 MG/DL (ref 65–117)
GLUCOSE SERPL-MCNC: 130 MG/DL (ref 65–100)
GLUCOSE UR STRIP.AUTO-MCNC: NEGATIVE MG/DL
HCT VFR BLD AUTO: 41.3 % (ref 35–47)
HGB BLD-MCNC: 14.1 G/DL (ref 11.5–16)
HGB UR QL STRIP: NEGATIVE
IMM GRANULOCYTES # BLD AUTO: 0 K/UL (ref 0–0.04)
IMM GRANULOCYTES NFR BLD AUTO: 0 % (ref 0–0.5)
KETONES UR QL STRIP.AUTO: NEGATIVE MG/DL
LEFT CCA DIST DIAS: 19.9 CM/S
LEFT CCA DIST SYS: 80.1 CM/S
LEFT CCA PROX DIAS: 15.3 CM/S
LEFT CCA PROX SYS: 94.5 CM/S
LEFT ECA DIAS: 5.3 CM/S
LEFT ECA SYS: 87.4 CM/S
LEFT ICA DIST DIAS: 19.9 CM/S
LEFT ICA DIST SYS: 80.1 CM/S
LEFT ICA MID DIAS: 16.2 CM/S
LEFT ICA MID SYS: 80.1 CM/S
LEFT ICA PROX DIAS: 18.1 CM/S
LEFT ICA PROX SYS: 85.6 CM/S
LEFT ICA/CCA SYS: 1.1 NO UNITS
LEFT VERTEBRAL DIAS: 0 CM/S
LEFT VERTEBRAL SYS: 39.4 CM/S
LEUKOCYTE ESTERASE UR QL STRIP.AUTO: ABNORMAL
LYMPHOCYTES # BLD: 1.6 K/UL (ref 0.8–3.5)
LYMPHOCYTES NFR BLD: 21 % (ref 12–49)
MCH RBC QN AUTO: 30.3 PG (ref 26–34)
MCHC RBC AUTO-ENTMCNC: 34.1 G/DL (ref 30–36.5)
MCV RBC AUTO: 88.8 FL (ref 80–99)
MONOCYTES # BLD: 0.6 K/UL (ref 0–1)
MONOCYTES NFR BLD: 8 % (ref 5–13)
NEUTS SEG # BLD: 5.3 K/UL (ref 1.8–8)
NEUTS SEG NFR BLD: 69 % (ref 32–75)
NITRITE UR QL STRIP.AUTO: NEGATIVE
NRBC # BLD: 0 K/UL (ref 0–0.01)
NRBC BLD-RTO: 0 PER 100 WBC
P-R INTERVAL, ECG05: 200 MS
PH UR STRIP: 6 (ref 5–8)
PLATELET # BLD AUTO: 310 K/UL (ref 150–400)
PMV BLD AUTO: 10 FL (ref 8.9–12.9)
POTASSIUM SERPL-SCNC: 3.4 MMOL/L (ref 3.5–5.1)
PROT SERPL-MCNC: 7.4 G/DL (ref 6.4–8.2)
PROT UR STRIP-MCNC: NEGATIVE MG/DL
Q-T INTERVAL, ECG07: 422 MS
QRS DURATION, ECG06: 144 MS
QTC CALCULATION (BEZET), ECG08: 471 MS
RBC # BLD AUTO: 4.65 M/UL (ref 3.8–5.2)
RBC #/AREA URNS HPF: ABNORMAL /HPF (ref 0–5)
RIGHT CCA DIST DIAS: 18.1 CM/S
RIGHT CCA DIST SYS: 93 CM/S
RIGHT CCA PROX DIAS: 18.1 CM/S
RIGHT CCA PROX SYS: 96.6 CM/S
RIGHT ECA DIAS: 0 CM/S
RIGHT ECA SYS: 87.5 CM/S
RIGHT ICA DIST DIAS: 27.2 CM/S
RIGHT ICA DIST SYS: 111.2 CM/S
RIGHT ICA MID DIAS: 23.6 CM/S
RIGHT ICA MID SYS: 100.3 CM/S
RIGHT ICA PROX DIAS: 18.1 CM/S
RIGHT ICA PROX SYS: 71 CM/S
RIGHT ICA/CCA SYS: 1.2 NO UNITS
RIGHT VERTEBRAL DIAS: 7.1 CM/S
RIGHT VERTEBRAL SYS: 36.3 CM/S
SERVICE CMNT-IMP: ABNORMAL
SODIUM SERPL-SCNC: 142 MMOL/L (ref 136–145)
SP GR UR REFRACTOMETRY: 1.01
TROPONIN I SERPL HS-MCNC: 15 NG/L (ref 0–51)
UA: UC IF INDICATED,UAUC: ABNORMAL
UROBILINOGEN UR QL STRIP.AUTO: 1 EU/DL (ref 0.2–1)
VAS LEFT SUBCLAVIAN PROX EDV: 0 CM/S
VAS LEFT SUBCLAVIAN PROX PSV: 89.2 CM/S
VAS RIGHT SUBCLAVIAN PROX EDV: 0 CM/S
VAS RIGHT SUBCLAVIAN PROX PSV: 124 CM/S
VENTRICULAR RATE, ECG03: 75 BPM
WBC # BLD AUTO: 7.6 K/UL (ref 3.6–11)
WBC URNS QL MICRO: ABNORMAL /HPF (ref 0–4)

## 2023-03-29 PROCEDURE — 93005 ELECTROCARDIOGRAM TRACING: CPT

## 2023-03-29 PROCEDURE — 70450 CT HEAD/BRAIN W/O DYE: CPT

## 2023-03-29 PROCEDURE — 71045 X-RAY EXAM CHEST 1 VIEW: CPT

## 2023-03-29 PROCEDURE — 82962 GLUCOSE BLOOD TEST: CPT

## 2023-03-29 PROCEDURE — 99285 EMERGENCY DEPT VISIT HI MDM: CPT

## 2023-03-29 PROCEDURE — 70551 MRI BRAIN STEM W/O DYE: CPT

## 2023-03-29 PROCEDURE — 92610 EVALUATE SWALLOWING FUNCTION: CPT | Performed by: SPEECH-LANGUAGE PATHOLOGIST

## 2023-03-29 PROCEDURE — 94760 N-INVAS EAR/PLS OXIMETRY 1: CPT

## 2023-03-29 PROCEDURE — 97165 OT EVAL LOW COMPLEX 30 MIN: CPT

## 2023-03-29 PROCEDURE — 80053 COMPREHEN METABOLIC PANEL: CPT

## 2023-03-29 PROCEDURE — 97530 THERAPEUTIC ACTIVITIES: CPT

## 2023-03-29 PROCEDURE — 95816 EEG AWAKE AND DROWSY: CPT | Performed by: PSYCHIATRY & NEUROLOGY

## 2023-03-29 PROCEDURE — 93880 EXTRACRANIAL BILAT STUDY: CPT | Performed by: PSYCHIATRY & NEUROLOGY

## 2023-03-29 PROCEDURE — 70496 CT ANGIOGRAPHY HEAD: CPT

## 2023-03-29 PROCEDURE — 82140 ASSAY OF AMMONIA: CPT

## 2023-03-29 PROCEDURE — 85025 COMPLETE CBC W/AUTO DIFF WBC: CPT

## 2023-03-29 PROCEDURE — 74011250636 HC RX REV CODE- 250/636: Performed by: STUDENT IN AN ORGANIZED HEALTH CARE EDUCATION/TRAINING PROGRAM

## 2023-03-29 PROCEDURE — 65270000046 HC RM TELEMETRY

## 2023-03-29 PROCEDURE — 74011000258 HC RX REV CODE- 258: Performed by: STUDENT IN AN ORGANIZED HEALTH CARE EDUCATION/TRAINING PROGRAM

## 2023-03-29 PROCEDURE — 97161 PT EVAL LOW COMPLEX 20 MIN: CPT

## 2023-03-29 PROCEDURE — 99223 1ST HOSP IP/OBS HIGH 75: CPT | Performed by: PSYCHIATRY & NEUROLOGY

## 2023-03-29 PROCEDURE — 81001 URINALYSIS AUTO W/SCOPE: CPT

## 2023-03-29 PROCEDURE — 84484 ASSAY OF TROPONIN QUANT: CPT

## 2023-03-29 PROCEDURE — 93880 EXTRACRANIAL BILAT STUDY: CPT

## 2023-03-29 PROCEDURE — 36415 COLL VENOUS BLD VENIPUNCTURE: CPT

## 2023-03-29 PROCEDURE — 74011250637 HC RX REV CODE- 250/637: Performed by: STUDENT IN AN ORGANIZED HEALTH CARE EDUCATION/TRAINING PROGRAM

## 2023-03-29 PROCEDURE — 74011000636 HC RX REV CODE- 636: Performed by: EMERGENCY MEDICINE

## 2023-03-29 PROCEDURE — 87086 URINE CULTURE/COLONY COUNT: CPT

## 2023-03-29 PROCEDURE — 95819 EEG AWAKE AND ASLEEP: CPT | Performed by: PSYCHIATRY & NEUROLOGY

## 2023-03-29 RX ORDER — GUAIFENESIN 100 MG/5ML
81 LIQUID (ML) ORAL DAILY
Status: DISCONTINUED | OUTPATIENT
Start: 2023-03-29 | End: 2023-03-30 | Stop reason: HOSPADM

## 2023-03-29 RX ORDER — SODIUM CHLORIDE 0.9 % (FLUSH) 0.9 %
5-40 SYRINGE (ML) INJECTION EVERY 8 HOURS
Status: DISCONTINUED | OUTPATIENT
Start: 2023-03-29 | End: 2023-03-29

## 2023-03-29 RX ORDER — SODIUM CHLORIDE 0.9 % (FLUSH) 0.9 %
5-40 SYRINGE (ML) INJECTION AS NEEDED
Status: DISCONTINUED | OUTPATIENT
Start: 2023-03-29 | End: 2023-03-29

## 2023-03-29 RX ORDER — ACETAMINOPHEN 325 MG/1
650 TABLET ORAL
Status: DISCONTINUED | OUTPATIENT
Start: 2023-03-29 | End: 2023-03-30 | Stop reason: HOSPADM

## 2023-03-29 RX ORDER — ENOXAPARIN SODIUM 100 MG/ML
40 INJECTION SUBCUTANEOUS EVERY 24 HOURS
Status: DISCONTINUED | OUTPATIENT
Start: 2023-03-29 | End: 2023-03-30 | Stop reason: HOSPADM

## 2023-03-29 RX ORDER — ACETAMINOPHEN 650 MG/1
650 SUPPOSITORY RECTAL
Status: DISCONTINUED | OUTPATIENT
Start: 2023-03-29 | End: 2023-03-30 | Stop reason: HOSPADM

## 2023-03-29 RX ADMIN — POTASSIUM BICARBONATE 40 MEQ: 391 TABLET, EFFERVESCENT ORAL at 09:52

## 2023-03-29 RX ADMIN — ENOXAPARIN SODIUM 40 MG: 100 INJECTION SUBCUTANEOUS at 08:32

## 2023-03-29 RX ADMIN — IOMEPROL INJECTION 100 ML: 714 INJECTION, SOLUTION INTRAVASCULAR at 04:12

## 2023-03-29 RX ADMIN — SODIUM CHLORIDE 1 G: 900 INJECTION INTRAVENOUS at 06:28

## 2023-03-29 RX ADMIN — ASPIRIN 81 MG: 81 TABLET, CHEWABLE ORAL at 08:31

## 2023-03-29 NOTE — ED NOTES
eTRANSFER SBAR NOTE    IP UNIT CALLED NOTE IS READY: Yes Spoke to Santa    IF there are questions Call transferring nurse (your name) Shanita Burns at phone # [phone]    SITUATION/BACKGROUND:    Patient is being transferred to [address], Room# 141    Patient's Chief Complaint on arrival to ED was AMS and is admitted for Amnesia. CODE STATUS: Full Code    VITAL SIGNS (MUST BE WITHIN 1 HOUR OF TRANSFER TO IP UNIT:    TEMP: 98.1  PULSE: 76  RESP: 19  BP: 162/80  PAIN SCORE: 0  MEWS: 1     ISOLATION/PRECAUTIONS: No   ISOLATION TYPE:     Called outstanding consults: No      Are there sign and held orders that need to be released? No   Are all STAT orders completed: Yes    STAT labs collected: Yes  REPEAT LACTIC ACID DUE? No  TIME DUE:   Critical Labs Results? No What? Are there any titrating drips? No   If so what? The following personal items will be sent with the patient during transfer to the floor: All valuables:   ITEM:    ITEM: Visual Aid: Glasses, With patient  ITEM:           ASSESSMENT:    CIWA Assessment: No  Last Score:     NEURO:   NIH SCORE:   Total: 1 (03/29/23 0449)    RAFAL SCREENING:      PASSED    NEURO ASSESSMENT:   Neuro  Neurologic State: Alert, Appropriate for age (03/29/23 0449)  Orientation Level: Disoriented to time, Oriented to person, Oriented to place, Oriented to situation (03/29/23 0449)  Cognition: Follows commands (03/29/23 0449)  Speech: Appropriate for age, Clear (03/29/23 0449)  Assessment L Pupil: Brisk (03/29/23 0449)  Assessment R Pupil: Brisk (03/29/23 0449)  LUE Motor Response: Purposeful, Spontaneous  (03/29/23 0449)  LLE Motor Response: Spontaneous , Purposeful (03/29/23 0449)  RUE Motor Response: Spontaneous , Purposeful (03/29/23 0449)  RLE Motor Response: Purposeful, Spontaneous  (03/29/23 0449)    Is patient impulsive? No   Is patient oriented? No    Do they follow commands? Yes  Is the patient ambulatory? Yes Device need x1 person assist    FALL RISK?  Yes   Is the Bäch 1 Assessment completed? Yes  INTERVENTIONS: bed in low position, wheels locked, call bell in reach    INTEGUMENTARY:   IS THE PATIENT UNDRESSED? Yes  WOUNDS PRESENT? No  On admit to ED No   ARE THE WOUNDS DOCUMENTED? No     RESPIRATORY:   Is patient on Oxygen? No    OXYGEN: Oxygen Therapy  O2 Device: None (Room air) (23 0148)  IS patient on VENT? No      CARDIAC:   Is cardiac monitoring ordered? Yes Last Rhythm: Sinus Rhythm  Patient to transfer with tele box on? Yes  Is patient using a LIFE VEST? No     LINE ACCESS:   Peripheral IV 23 Anterior;Proximal;Right Forearm (Active)        /GI:   CONTINENT BOWEL/BLADDER? Yes  URINARY OUTPUT: voiding   Written Order for Avila Cath? No   CHRONIC OR ACUTE? If CHRONIC, is it 1days old, was it changed prior to specimen collection? No   WAS UA WITH REFLEX SENT TO LAB? Yes IF NO,  COLLECT AND SEND PRIOR TO TRANSPORT TO INPATIENT AREA    RESTRAINTS IN USE: No      IS DOCUMENTATION COMPLETE: Yes  Is there a current Order? No  When does it ?      Additional details as Needed:

## 2023-03-29 NOTE — PROGRESS NOTES
Attempted to see patient for swallow evaluation. She is currently off the floor. Following to assess as appropriate. Leila Kinsey M.S., CCC-SLP

## 2023-03-29 NOTE — PROGRESS NOTES
Problem: Self Care Deficits Care Plan (Adult)  Goal: *Acute Goals and Plan of Care (Insert Text)  Description: FUNCTIONAL STATUS PRIOR TO ADMISSION: Per pt report and CM note, lives with sister, Elaine Montenegro. Normally independent in most activities; drives. Per sister she does the cooking for [employer]. They reside in a house with no stairs. Per CM note, the sister works full time and would not be able to provide 24 hr supervision. Occupational Therapy Goals  Initiated 3/29/2023    1. Patient will gather ADL items in prep for bathing in bathroom with SBA within 7 days. 2.  Patient will perform bathroom mobility with Jj within 7 days. 3.  Patient will identify and correct 3/3 safety hazards within room with min to no cues within 7 days. 4.  Patient will perform dressing tasks with Jj within 7 days. 5.  Patient will participate in UE therapeutic exercise/activities with supervision for 10 minutes within 7 days. 6.  Patient will participate in [facility] cognitive assessment to determine IADL safety within 7 days. Outcome: Progressing Towards Goal     OCCUPATIONAL THERAPY EVALUATION  Patient: Deborah Arellano (89 y.o. female)  Date: 3/29/2023  Primary Diagnosis: Amnesia [R41.3]       Precautions: fall,  Bed Alarm    ASSESSMENT  Based on the objective data described below, the patient presents with overall functional strength, mildly impaired functional balance dynamically that is likely impacted by distractibility, impaired cognition (memory, tangential, attention/concentration to task, mild problem solving), following admission for AMS. Pt received in bed, alert and oriented x4; some confusion noted. Pt pleasant and following 100% commands. ADLs overall SBA to CGA, functional mobility to bathroom CGA with no AD or overt LOB; completed toileting SBA, UB dressing in standing CGA for tying gown behind neck and grooming with SBA. Per chart, pt was living with sister who apparently assists with meal prep.  Feel pt would likely benefit from [facility] for safety evaluation, particularly with IADLs and assistance with medication management. Acute OT to follow-up for cognitive deficits during ADL/IADLs. Pt transferred onto stretcher with supervision following evaluation for testing. Current Level of Function Impacting Discharge (ADLs/self-care): SBA to CGA    Functional Outcome Measure: The patient scored Total: 70/100 on the Barthel Index outcome measure which is indicative of being minimally dependent in basic self-care. Other factors to consider for discharge: safety concerns with ADL/IADLs     Patient will benefit from skilled therapy intervention to address the above noted impairments. PLAN :  Recommendations and Planned Interventions: self care training, functional mobility training, therapeutic exercise, balance training, therapeutic activities, cognitive retraining, endurance activities, patient education, home safety training, and family training/education    Frequency/Duration: Patient will be followed by occupational therapy 4 times a week to address goals. Recommendation for discharge: (in order for the patient to meet his/her long term goals)  Occupational therapy at least 2 days/week in the home AND ensure assist and/or supervision for safety with ADL/IADLs    This discharge recommendation:  Has not yet been discussed the attending provider and/or case management    IF patient discharges home will need the following DME: none at this time       SUBJECTIVE:   Patient stated My family brought me in; I wasn't feeling well.     OBJECTIVE DATA SUMMARY:   HISTORY:   Past Medical History:   Diagnosis Date    Arthritis     Cancer ([facility] [address]     vulva    Cerebral artery occlusion with cerebral infarction ([facility] [address]     Hypertension     Shoulder fracture, left      Past Surgical History:   Procedure Laterality Date    HX CHOLECYSTECTOMY      HX GYN         Expanded or extensive additional review of patient history:     Home Situation  Home Environment: Private residence  # Steps to Enter: 2  Rails to Enter: Yes  Hand Rails : Left  One/Two Story Residence: One story  Living Alone: No  Support Systems: Other Family Member(s), Child(kt)  Patient Expects to be Discharged to[de-identified] Home with home health  Current DME Used/Available at Home: Cane, straight, Commode, bedside, Walker, rollator  Tub or Shower Type: Tub/Shower combination    Hand dominance: Right    EXAMINATION OF PERFORMANCE DEFICITS:  Cognitive/Behavioral Status:  Neurologic State: Alert;Confused  Orientation Level: Oriented to person;Oriented to place;Oriented to situation;Oriented to time (oriented to year, month and day of week)  Cognition: Follows commands;Memory loss  Perception: Appears intact  Perseveration: Perseverates during conversation  Safety/Judgement: Awareness of environment;Decreased awareness of need for safety;Decreased insight into deficits    Skin: appears intact, please see nursing notes    Edema: none noted in BUEs    Hearing: Auditory  Auditory Impairment: None    Vision/Perceptual:                      Diplopia: No    Acuity: Able to read clock/calendar on wall without difficulty; Able to read employee name badge without difficulty         Range of Motion:    AROM: Within functional limits  PROM: Within functional limits                      Strength:    Strength: Generally decreased, functional                Coordination:  Coordination: Within functional limits  Fine Motor Skills-Upper: Left Intact; Right Intact    Gross Motor Skills-Upper: Left Intact; Right Intact    Tone & Sensation:    Tone: Normal  Sensation: Intact                      Balance:  Sitting: Intact  Standing: Impaired; Without support  Standing - Static: Good  Standing - Dynamic : Good    Functional Mobility and Transfers for ADLs:  Bed Mobility:  Rolling: Independent  Supine to Sit: Supervision  Scooting: Supervision    Transfers:  Sit to Stand: Stand-by assistance  Stand to Sit: Stand-by assistance  Bathroom Mobility: Stand-by assistance  Toilet Transfer : Stand-by assistance    ADL Assessment:  Feeding: Independent    Oral Facial Hygiene/Grooming: Stand-by assistance (standing at sink)    Bathing: Stand-by assistance; Additional time;Assist x1         Upper Body Dressing: Stand-by assistance; Additional time    Lower Body Dressing: Stand-by assistance    Toileting: Stand by assistance; Additional time                ADL Intervention and task modifications: Toileting: SBA    UB Dressing: don/doff new gown Tran to tie gown behind neck in standing    Grooming: washing hands SBA at sink         Cognitive Retraining  Safety/Judgement: Awareness of environment;Decreased awareness of need for safety;Decreased insight into deficits     Functional Measure:    Barthel Index:  Bathin  Bladder: 5  Bowels: 10  Groomin  Dressin  Feeding: 10  Mobility: 10  Stairs: 5  Toilet Use: 10  Transfer (Bed to Chair and Back): 10  Total: 70/100      The Barthel ADL Index: Guidelines  1. The index should be used as a record of what a patient does, not as a record of what a patient could do. 2. The main aim is to establish degree of independence from any help, physical or verbal, however minor and for whatever reason. 3. The need for supervision renders the patient not independent. 4. A patient's performance should be established using the best available evidence. Asking the patient, friends/relatives and nurses are the usual sources, but direct observation and common sense are also important. However direct testing is not needed. 5. Usually the patient's performance over the preceding 24-48 hours is important, but occasionally longer periods will be relevant. 6. Middle categories imply that the patient supplies over 50 per cent of the effort. 7. Use of aids to be independent is allowed.     Score Interpretation (from [address] [address])    Independent   60-79 Minimally independent   40-59 Partially dependent 20-39 Very dependent   <20 Totally dependent     -Arabella Elmore, Barthel, D.W. (3077). Functional evaluation: the Barthel Index. [address] ([address] [facility] [address] (1997). The Barthel activities of daily living index: self-reporting versus actual performance in the old (> or = 75 years). Journal of [address] [place] [address] 45(7), [facility], J.TIANF, Archie Hernandez., Frankie Duenas. (1999). Measuring the change in disability after inpatient rehabilitation; comparison of the responsiveness of the Barthel Index and Functional Loup Measure. Journal of Neurology, Neurosurgery, and Psychiatry, 66(4), 212-336. Siomara Do, [place].ELENA, DUNG Amaro, & Martha Woodruff M.A. (2004) Assessment of post-stroke quality of life in cost-effectiveness studies: The usefulness of the Barthel Index and the EuroQoL-5D. Quality of Life Research, 15, 759-00     Occupational Therapy Evaluation Charge Determination   History Examination Decision-Making   LOW Complexity : Brief history review  LOW Complexity : 1-3 performance deficits relating to physical, cognitive , or psychosocial skils that result in activity limitations and / or participation restrictions  MEDIUM Complexity : Patient may present with comorbidities that affect occupational performnce. Miniml to moderate modification of tasks or assistance (eg, physical or verbal ) with assesment(s) is necessary to enable patient to complete evaluation       Based on the above components, the patient evaluation is determined to be of the following complexity level: LOW   Pain Rating:  No c/o pain    Activity Tolerance:   Good    After treatment patient left in no apparent distress: On stretcher to go for testing    COMMUNICATION/EDUCATION:   The patients plan of care was discussed with: Physical therapist and Registered nurse.      Home safety education was provided and the patient/caregiver indicated understanding., Patient/family have participated as able in goal setting and plan of care. , and Patient/family agree to work toward stated goals and plan of care. This patients plan of care is appropriate for delegation to ERNESTINA.     Thank you for this referral.  Bala Lorenzo OT  Time Calculation: 10 mins

## 2023-03-29 NOTE — H&P
This note is compiled to communicate with the medical care team. It may contain sensitive and protected information. It is not intended to serve as a source of communication with patients/families/friends; that communication occurs at the bedside or via alternative direct communications means (secure messaging, letters, video/telephone, etc.). Hospitalist Admission Note    NAME: Aj Morocho   :  1945   MRN:  [id number]     Date/Time:  3/29/2023 5:59 AM    Patient PCP: Rosa Menchaca MD  ______________________________________________________________________  Given the patient's current clinical presentation, I have a high level of concern for decompensation if discharged from the emergency department. Complex decision making was performed, which includes reviewing the patient's available past medical records, laboratory results, and x-ray films. My assessment of this patient's clinical condition and my plan of care is as follows. Subjective:   CHIEF COMPLAINT: Amnesia    ED TRIAGE SUMMARY:  Chief Complaint   Patient presents with    Altered mental status     Pt arrives for c/o \"not feeling right\". Pt brought in by her son and her sister, who describe that pt has been forgetful and altered since 1700 last night. Pt does not remember driving herself to a baseball game. Also does not remember what she did in recent days. Prior to tonight, pt was able to remember these details. Pt also reports increasing forgetfulness recently. HISTORY OF PRESENT ILLNESS:     Migdalia Roman is a 78 y.o.  female with pertinent past medical history as listed below who presents accompanied by sister and grandson with concerns for declining mental status. They report patient has been more forgetful lately and suspect she has underlying dementia, but has had 2 overtly amnesic episodes recently. The first being this past weekend when she attended a  and has no recollection of doing so.   Second occurred yesterday when she attended her grandson's baseball game and has no memory of attending the game or driving to or from the event. She denies any other complaints. UA concerning for UTI and on questioning she does admit to some dysuria. ROS otherwise negative. She presents with disorganized medication bag with conflicting medications raising concern for polypharmacy. In the ED, patient afebrile and hemodynamically stable (hypertensive 170s/70s), saturating mid 90s on room air. ECG demonstrates normal sinus rhythm with bifascicular block. CT head negative for acute process CTA head negative for acute process. CXR demonstrates no acute process. Labs demonstrate: UA concerning for UTI (leukocyte Estrace, 1+ bacteria), high sensitive troponin 15, sodium 142, potassium 3.4, glucose 130, BUN 17, creatinine 0.73, ammonia 13, CBC unremarkable. Patient given ceftriaxone for UTI. We were asked to admit for work up and evaluation of the above problems. Past Medical History:   Diagnosis Date    Arthritis     Cancer ([facility] [address]     vulva    Cerebral artery occlusion with cerebral infarction [facility])     Hypertension     Shoulder fracture, left         Past Surgical History:   Procedure Laterality Date    HX CHOLECYSTECTOMY      HX GYN         Social History     Tobacco Use    Smoking status: Never    Smokeless tobacco: Never   Substance Use Topics    Alcohol use: No        No family history on file. Allergies   Allergen Reactions    Pcn [Penicillins] Hives    Tape [Adhesive] Other (comments)     Tears skin, redness        Prior to Admission medications    Medication Sig Start Date End Date Taking? Authorizing Provider   butalbital-acetaminophen-caffeine (FIORICET, ESGIC) -40 mg per tablet Take 1 Tablet by mouth every six (6) hours as needed. Provider, Historical   omeprazole (PRILOSEC) 40 mg capsule Take 1 Capsule by mouth daily.   Patient not taking: Reported on 10/19/2022    Provider, Historical gabapentin (NEURONTIN) 100 mg capsule Take 1 Capsule by mouth three (3) times daily. Max Daily Amount: 300 mg. Patient taking differently: Take 100 mg by mouth two (2) times a day. 7/6/22   Osvaldo Heaton MD   ibuprofen (MOTRIN) 600 mg tablet Take 1 Tab by mouth every six (6) hours as needed for Pain. Patient not taking: Reported on 10/19/2022 10/20/20   EMILY Maddox   rosuvastatin (CRESTOR) 10 mg tablet take 1 tablet by mouth once daily 7/11/18   Provider, Historical   ascorbic acid, vitamin C, (VITAMIN C) 500 mg tablet Take  by mouth daily. Provider, Historical   diphenhydrAMINE (BENADRYL) 25 mg tablet Take 25 mg by mouth nightly. Patient not taking: Reported on 10/19/2022    Provider, Historical   cholecalciferol (VITAMIN D3) 400 unit tab tablet Take  by mouth daily. Provider, Historical   docusate sodium (COLACE) 100 mg capsule Take 100 mg by mouth two (2) times daily as needed. Pablo Nunez MD   peg 400-propylene glycol (SYSTANE) 0.4-0.3 % drop Administer  to left eye as needed. Pablo Nunez MD   multivitamin (ONE A DAY) tablet Take 1 Tab by mouth daily. Pablo Nunez MD   clonazePAM (KLONOPIN) 0.5 mg tablet Take  by mouth nightly as needed. Pablo Nunez MD   meclizine (ANTIVERT) 25 mg tablet Take 1 Tab by mouth three (3) times daily as needed for Dizziness. 11/18/17   Mayte Bruno,    aspirin 81 mg tablet Take 81 mg by mouth daily. 3/10/11   Pablo Nunez MD       REVIEW OF SYSTEMS:     I am not able to complete the review of systems because:    The patient is intubated and sedated   x The patient has altered mental status due to his acute medical problems    The patient has underlying chronic medical condition making them unreliable historian    The patient has baseline dementia and is not reliable historian    The patient is in acute medical distress and unable to provide information           Comprehensive review of systems obtained with pertinent positives and negatives as documented in HPI. Objective:   VITALS:    Visit Vitals  BP (!) 179/75   Pulse 82   Temp 98.1 °F (36.7 °C)   Resp 20   SpO2 95%       PHYSICAL EXAM:    General:   Alert, cooperative, pleasantly confused elderly  female in no apparent distress        HEENT:  Atraumatic, anicteric sclerae, pink conjunctivae, mucosa moist, dentition fair     Neck:  Supple, symmetrical, trachea midline     Lungs:   CTAB. Symmetric expansion. Good aeration. Normal respiratory effort. Chest wall:  No tenderness. No Accessory muscle use. Cardiovascular:   RRR, No murmur/rub/gallop. No JVD. Radial/AT/DP pulse 2+     GI/:   Soft, non-tender. Not distended. Bowel sounds normal. No HSM     Extremities No edema. No cyanosis. Skin: No significant lesions noted. Not Jaundiced. No rashes      Neurologic: Cranial nerves II through XII grossly intact. Gross muscle strength 5/5 upper and lower extremities, sensation to light touch intact in all extremities, negative finger-to-nose and heel shin testing     Psych:  Alert and oriented to person and place disoriented to time and event. Tangential thought processes.   Short and long-term memory loss appreciated on exam.     Other:   N/A         LAB DATA REVIEWED:    Recent Results (from the past 24 hour(s))   GLUCOSE, POC    Collection Time: 03/29/23  1:31 AM   Result Value Ref Range    Glucose (POC) 148 (H) 65 - 117 mg/dL    Performed by Alysa Nicole (ARTURO RN)    URINALYSIS W/ REFLEX CULTURE    Collection Time: 03/29/23  1:43 AM    Specimen: Urine   Result Value Ref Range    Color YELLOW/STRAW      Appearance CLEAR CLEAR      Specific gravity 1.014      pH (UA) 6.0 5.0 - 8.0      Protein Negative NEG mg/dL    Glucose Negative NEG mg/dL    Ketone Negative NEG mg/dL    Bilirubin Negative NEG      Blood Negative NEG      Urobilinogen 1.0 0.2 - 1.0 EU/dL    Nitrites Negative NEG      Leukocyte Esterase LARGE (A) NEG      WBC 10-20 0 - 4 /hpf    RBC 0-5 0 - 5 /hpf    Epithelial cells FEW FEW /lpf    Bacteria 1+ (A) NEG /hpf    UA:UC IF INDICATED URINE CULTURE ORDERED (A) CNI     EKG, 12 LEAD, INITIAL    Collection Time: 03/29/23  2:06 AM   Result Value Ref Range    Ventricular Rate 75 BPM    Atrial Rate 75 BPM    P-R Interval 200 ms    QRS Duration 144 ms    Q-T Interval 422 ms    QTC Calculation (Bezet) 471 ms    Calculated P Axis 66 degrees    Calculated R Axis -61 degrees    Calculated T Axis 19 degrees    Diagnosis       Normal sinus rhythm  Right bundle branch block  Left anterior fascicular block  ** Bifascicular block **  Minimal voltage criteria for LVH, may be normal variant  When compared with ECG of 05-DEC-2017 14:50,  No significant change was found     CBC WITH AUTOMATED DIFF    Collection Time: 03/29/23  2:13 AM   Result Value Ref Range    WBC 7.6 3.6 - 11.0 K/uL    RBC 4.65 3.80 - 5.20 M/uL    HGB 14.1 11.5 - 16.0 g/dL    HCT 41.3 35.0 - 47.0 %    MCV 88.8 80.0 - 99.0 FL    MCH 30.3 26.0 - 34.0 PG    MCHC 34.1 30.0 - 36.5 g/dL    RDW 12.8 11.5 - 14.5 %    PLATELET 872 133 - 711 K/uL    MPV 10.0 8.9 - 12.9 FL    NRBC 0.0 0  WBC    ABSOLUTE NRBC 0.00 0.00 - 0.01 K/uL    NEUTROPHILS 69 32 - 75 %    LYMPHOCYTES 21 12 - 49 %    MONOCYTES 8 5 - 13 %    EOSINOPHILS 1 0 - 7 %    BASOPHILS 1 0 - 1 %    IMMATURE GRANULOCYTES 0 0.0 - 0.5 %    ABS. NEUTROPHILS 5.3 1.8 - 8.0 K/UL    ABS. LYMPHOCYTES 1.6 0.8 - 3.5 K/UL    ABS. MONOCYTES 0.6 0.0 - 1.0 K/UL    ABS. EOSINOPHILS 0.1 0.0 - 0.4 K/UL    ABS. BASOPHILS 0.1 0.0 - 0.1 K/UL    ABS. IMM.  GRANS. 0.0 0.00 - 0.04 K/UL    DF AUTOMATED     METABOLIC PANEL, COMPREHENSIVE    Collection Time: 03/29/23  2:13 AM   Result Value Ref Range    Sodium 142 136 - 145 mmol/L    Potassium 3.4 (L) 3.5 - 5.1 mmol/L    Chloride 108 97 - 108 mmol/L    CO2 31 21 - 32 mmol/L    Anion gap 3 (L) 5 - 15 mmol/L    Glucose 130 (H) 65 - 100 mg/dL    BUN 17 6 - 20 MG/DL    Creatinine 0.73 0.55 - 1.02 MG/DL    BUN/Creatinine ratio 23 (H) 12 - 20      eGFR >60 >60 ml/min/1.73m2    Calcium 9.3 8.5 - 10.1 MG/DL    Bilirubin, total 0.3 0.2 - 1.0 MG/DL    ALT (SGPT) 22 12 - 78 U/L    AST (SGOT) 16 15 - 37 U/L    Alk. phosphatase 66 45 - 117 U/L    Protein, total 7.4 6.4 - 8.2 g/dL    Albumin 3.7 3.5 - 5.0 g/dL    Globulin 3.7 2.0 - 4.0 g/dL    A-G Ratio 1.0 (L) 1.1 - 2.2     AMMONIA    Collection Time: 03/29/23  2:13 AM   Result Value Ref Range    Ammonia, plasma 13 <32 UMOL/L   TROPONIN-HIGH SENSITIVITY    Collection Time: 03/29/23  2:13 AM   Result Value Ref Range    Troponin-High Sensitivity 15 0 - 51 ng/L       IMAGING:  CXR Results  (Last 48 hours)                 03/29/23 0306  XR CHEST PORT Final result    Impression:  No acute process. Narrative:  INDICATION: Chest pain         EXAM:  AP CHEST RADIOGRAPH       COMPARISON: 12/5/2017       FINDINGS:       AP portable view of the chest demonstrates a normal cardiomediastinal   silhouette. There is no edema, effusion, consolidation, or pneumothorax. The   osseous structures are unremarkable. CT Results  (Last 48 hours)                 03/29/23 0412  CTA HEAD NECK W CONT Preliminary result      This result has not been signed. Information might be incomplete. Narrative:  *PRELIMINARY REPORT*       No acute large vessel occlusion. Cerebral perfusion not performed       Preliminary report was provided by Dr. Vasiliy Anton the on-call radiologist.       Final report to follow. *END PRELIMINARY REPORT*                   03/29/23 0412  CT HEAD WO CONT Final result    Impression:      No acute process. Narrative:  INDICATION: ams       EXAM:  HEAD CT WITHOUT CONTRAST       COMPARISON: MRI February 17, 2018       TECHNIQUE:  Routine noncontrast axial head CT was performed. Sagittal and   coronal reconstructions were generated. CT dose reduction was achieved through use of a standardized protocol tailored   for this examination and automatic exposure control for dose modulation. FINDINGS:       Ventricles: Midline, no hydrocephalus. Intracranial Hemorrhage: None. Brain Parenchyma/Brainstem: Chronic white matter hypodensities. Basal Cisterns: Normal.   Paranasal Sinuses: Visualized sinuses are clear. Additional Comments: N/A.                 EKG (my interpretation): Normal sinus rhythm with bifascicular block and borderline first-degree AV block, no definitive ischemic change, rate 75, , QTc 471  ______________________________________________________________________    Assessment / Plan:  Amnesia-CVA versus delirium on dementia  Suspect underlying dementia  Concern for UTI  Essential hypertension  Osteoarthritis    PLAN:    Admit to telemetry monitoring  Neurology consulted, greatly appreciate their expertise  Obtain brain MRI  Continue empiric ceftriaxone  CTA head negative for large vessel stenosis-no need for carotid duplex  We will pursue TTE if MRI positive for CVA  Pharmacy consulted for medication reconciliation  -We will need to confirm with [facility] what scripts have been filled as medication bag with conflicting medications and disorganized raising concern for polypharmacy/medication misuse  Continue PTA aspirin  Suspect that this represents delirium on underlying dementia. However, given behavior during events and total amnesia will need to obtain MRI brain to ensure no intracranial pathology. Alternatively, patient was attending  for prior event however does not sound as though this was a close relation/friend so less likely to be a psychiatric manifestation. Case discussed directly with ED provider. After discussion I am in agreement that acuity of patient's medical condition necessitates hospital stay.     Code Status: Full  DVT Prophylaxis: lovenox  GI Prophylaxis: N/A  Diet: Cardiac       Care Plan discussed with:    Comments   Patient x    Family  x Present at bedside   RN     Care Manager                    Consultant: _______________________________________________________________________    Expected  Disposition:   Home with Family x   HH/PT/OT/RN    SNF/LTC    MALIA    ________________________________________________________________________    MEDICAL COMPLEXITY: high  TOBACCO CESSATION COUNSELING: NO        Comments    x Reviewed previous records   >50% of visit spent in counseling and coordination of care x Discussion with patient and/or family and questions answered       ________________________________________________________________________  Signed: Sera Castellon DO  3/29/2023 5:59 AM    Please note that this documentation was completed in part with Dragon dictation software. Occasionally unanticipated grammatical, syntax, homophones, and other interpretive errors are inadvertently transcribed by the computer software. Please excuse and disregard any errors that have escaped final proofreading. If in doubt, please do not hesitate to reach out to myself or the assigned hospitalist via [facility] paging system for clarification.

## 2023-03-29 NOTE — PROGRESS NOTES
End of Shift Note    Bedside shift change report given to FAYE RN (oncoming nurse) by Rudy Moreno RN (offgoing nurse). Report included the following information SBAR, Kardex, and ED Summary    Shift worked:  DAYS   Shift summary and any significant changes:     -NEW ADMIT   -MRI CHECKLIST COMPLETED   -DUPLEX, EEG, PT/OT, NEURO COMPLETED     Concerns for physician to address:  NONE   Zone phone for oncoming shift:   [phone]       Patient Gerson Clements  78 y.o.  3/29/2023  1:30 AM by Chino Martinez DO. Ashkan Miller was admitted from Home    Problem List  Patient Active Problem List    Diagnosis Date Noted    Amnesia 03/29/2023    Disturbance of memory 03/29/2023    Acute alteration in mental status 03/29/2023    Late onset Alzheimer's disease without behavioral disturbance ([facility] [address] 03/29/2023    Bilateral carotid artery stenosis 03/29/2023    Transient ischemic attack involving left internal carotid artery 03/29/2023     Past Medical History:   Diagnosis Date    Arthritis     Cancer ([facility] [address]     vulva    Cerebral artery occlusion with cerebral infarction [facility])     Hypertension     Shoulder fracture, left        Core Measures:  CVA: Yes Yes  CHF:No No  PNA:No No    Activity:  Activity Level:  Up with Assistance  Number times ambulated in hallways past shift: 0  Number of times OOB to chair past shift: 5    Cardiac:   Cardiac Monitoring: Yes      Cardiac Rhythm: Sinus Rhythm    Access:   Current line(s): PIV     Genitourinary:   Urinary status: voiding    Respiratory:   O2 Device: None (Room air)  Chronic home O2 use?: N/A  Incentive spirometer at bedside: N/A       GI:  Last Bowel Movement Date: 03/27/23  Current diet:  ADULT DIET Regular; Low Fat/Low Chol/High Fiber/2 gm Na  Passing flatus: YES  Tolerating current diet: YES       Pain Management:   Patient states pain is manageable on current regimen: N/A    Skin:  Cory Score: 22  Interventions: float heels and increase time out of bed    Patient Safety:  Fall Score:    Interventions: bed/chair alarm, assistive device (walker, cane, etc), and gripper socks     @Rollbelt  @dexterity to release roll belt  Yes/No ( must document dexterity  here by stating Yes or No here, otherwise this is a restraint and must follow restraint documentation policy.)    DVT prophylaxis:  DVT prophylaxis Med- Yes  DVT prophylaxis SCD or NADIR- No     Active Consults:  IP CONSULT TO NEUROLOGY    Length of Stay:  Expected LOS: 2d 12h  Actual LOS: 0  Discharge Plan: No      Prerna Low RN

## 2023-03-29 NOTE — ED NOTES
Pt last known well was before 1700. Pt went to son's baseball game, came back and was confused. Physician is at bedside. Pt is alert and oriented times 2 at this time and is disoriented to time and situation. Pt recognizes that she is confused, but is unclear of chain of events at this time.

## 2023-03-29 NOTE — PROGRESS NOTES
Transition of Care Plan:    RUR: 4% low   Disposition: to be determined   If SNF or IPR: Date FOC offered:  Date FOC received:  Date authorization started with reference number:  Date authorization received and expires:  Accepting facility:  Follow up appointments: to be made   DME needed:  n/a   Transportation at Discharge: to be determined   Keys or means to access home:      yes   IM Medicare Letter: 2 nd letter when dc ready  Is patient a [place] and connected with the [address]? No          If yes, was [employer] transfer form completed and [facility] notified? Caregiver Contact:  Discharge Caregiver contacted prior to discharge? Care Conference needed?:      possibly     Reason for Admission:                       RUR Score:                     Plan for utilizing home health:          PCP: First and Last name:  Bella Gurrola MD     Name of Practice:    Are you a current patient: Yes/No:    Approximate date of last visit:  [address]    Can you participate in a virtual visit with your PCP:                     Current Advanced Directive/Advance Care Plan: Full Code      Healthcare Decision Maker:   Click here to complete [address] including selection of the Healthcare Decision Maker Relationship (ie \"Primary\")                           Transition of Care Plan:                     REVIEWED medical record-  I also spoke with son and sister Ebonie Morrow (  [phone] ). Nancy Bergman lives with her sister Ebonie Morrow. Normally per son and sister Nancy Bergman is independent in most activities. Per sister she does the cooking for Nancy Bergman. Nancy Bergman went to an Urgent Care a few weeks ago per son. He is not sure if she still follows up with Dr Monique Grimes as listed. She does live at listed address with [facility] in a home. They reside in a house with no stairs. Per family the patient was driving. She is not a  and has no psych history.       The sister works full time and would not be able to provide 24 hr supervision ( see PT note as a possibility )-  discussed via [employer] with MD.                     Will need to be followed. Family would be receptive to home health if appropriate dc plan. She has never had home care,  nor has she stayed in a skilled facility before. Presented confused as per chart.       Sukh Don RN  1257 pm  Care manager

## 2023-03-29 NOTE — PROGRESS NOTES
Patient seen and examined at bedside. Alert and oriented x4. MoCa score of 17. Highest level of education 10th grade. Lost points for in nearly all categories. SLP recommends outpatient follow up with GI.  PT/OT recs home with home health and 24 hr supervision. Family works most of the day. Lives in home without stairs with sister. Sister cooks for her. Pharmacy to complete med recs. Appreciated. MRI brain and duplex pending. Neurology consult pending. Agree with overnight H&P. Continue empiric rocephin.

## 2023-03-29 NOTE — PROGRESS NOTES
SPEECH PATHOLOGY BEDSIDE SWALLOW EVALUATION/DISCHARGE  Patient: Sarahi Gorman (24 y.o. female)  Date: 3/29/2023  Primary Diagnosis: Amnesia [R41.3]       Precautions: fall       ASSESSMENT :  Based on the objective data described below, the patient presents with functional oropharyngeal swallow based on clinical assessment. She does endorse a history of globus sensation with pills and meats. This has been ongoing for about a month, per her report. She was seen by GI outpatient, and an EGD was recommended. The patient reports she is afraid to undergo this procedure, and had numerous questions for SLP about it. Deferred her to GI, though she can't recall who she saw. Skilled acute therapy provided by a speech-language pathologist is not indicated at this time. PLAN :  Recommendations:  Regular with thins-Consider GI which can likely be followed with outpatient. Discharge Recommendations: None     SUBJECTIVE:   Patient stated I get pills stuck right here. Bobetta Manuel Bobetta Manuel .     OBJECTIVE:     Past Medical History:   Diagnosis Date    Arthritis     Cancer ([facility] [address]     vulva    Cerebral artery occlusion with cerebral infarction ([facility] [address]     Hypertension     Shoulder fracture, left      Past Surgical History:   Procedure Laterality Date    HX CHOLECYSTECTOMY      HX GYN       Prior Level of Function/Home Situation:    Home Situation  Home Environment: Private residence  # Steps to Enter: 2  Rails to Enter: Yes  Hand Rails : Left  One/Two Story Residence: One story (one step up to living room)  Living Alone: No  Support Systems: Other Family Member(s) (sister lives next door)  Patient Expects to be Discharged to[de-identified] Home  Current DME Used/Available at Home: Jose Okeefe, cynator, Cane, straight, Commode, bedside  Tub or Shower Type: Tub/Shower combination  Diet prior to admission: regular with thins  Current Diet:  regular with thins   Cognitive and Communication Status:  Neurologic State: Alert  Orientation Level: Disoriented to time  Cognition: Follows commands     Perseveration: Perseverates during conversation  Safety/Judgement: Decreased insight into deficits, Decreased awareness of need for safety  Oral Assessment:  Oral Assessment  Labial: No impairment  Dentition: Natural  Oral Hygiene: moist  Lingual: No impairment  Mandible: No impairment  P.O. Trials:  Patient Position: up in chair  Vocal quality prior to P.O.: No impairment  Consistency Presented: Thin liquid; Solid;Puree  How Presented: Self-fed/presented;Spoon;Straw     Bolus Acceptance: No impairment  Bolus Formation/Control: No impairment     Propulsion: No impairment  Oral Residue: None  Initiation of Swallow: No impairment     Aspiration Signs/Symptoms: None                            Pain:  Pain Scale 1: Numeric (0 - 10)  Pain Intensity 1: 0     After treatment:   Patient left in no apparent distress in bed, Call bell within reach, and Nursing notified    COMMUNICATION/EDUCATION:   Patient was educated regarding purpose of SLP visit. She participated. The patient's plan of care including recommendations, planned interventions, and recommended diet changes were discussed with: Registered nurse.      Thank you for this referral.  Hillary Mares SLP  Time Calculation: 30 mins

## 2023-03-29 NOTE — PROGRESS NOTES
Problem: Mobility Impaired (Adult and Pediatric)  Goal: *Acute Goals and Plan of Care (Insert Text)  Description: FUNCTIONAL STATUS PRIOR TO ADMISSION: pt is poor historian. She is also highly self distracting and perseverative during report but eventually states that she lives with her sister, drives and is independent with ADLs and amb without device. All should be confirmed with family. She had apparently attended a grandson's baseball game prior to acute issues. Does remember this now. HOME SUPPORT PRIOR TO ADMISSION: The patient lived with sister but did not require assist. (By pt report)    Physical Therapy Goals  Initiated 3/29/2023  1. Patient will move from supine to sit and sit to supine , scoot up and down, and roll side to side in bed with independence within 7 day(s). 2.  Patient will transfer from bed to chair and chair to bed with independence using the least restrictive device within 7 day(s). 3.  Patient will perform sit to stand with independence within 7 day(s). 4.  Patient will ambulate with independence for 200 feet with the least restrictive device within 7 day(s). 5.  Patient will ascend/descend 4 stairs with handrail(s) with supervision/set-up within 7 day(s). Outcome: Not Met   PHYSICAL THERAPY EVALUATION  Patient: Ashkan Miller (46 y.o. female)  Date: 3/29/2023  Primary Diagnosis: Amnesia [R41.3]       Precautions: fall      ASSESSMENT  Based on the objective data described below, the patient presents with some confusion, scattered historian being very tangential and self distracting, perseverating on certain info. She shows good mobility and is able to navigate around room and bathroom with SBA, no device. No overt LOB or path deviations. She does follow commands and is oriented, pleasant. Did not complete SWANSON or BEFAST education due to transport arriving and pt going to testing.  Pt will likely need 24 S upon d/c due to safety issues with cognition, HHPT eval for safety eval would be beneficial but mobility is overall fairly good. Current Level of Function Impacting Discharge (mobility/balance): S to SBA    Functional Outcome Measure: The patient scored 22/24 on the [facility] outcome measure which is indicative of limited therapy needs at d/c      Other factors to consider for discharge: confusion, decreased safety due to high level of distractibilty     Patient will benefit from skilled therapy intervention to address the above noted impairments. PLAN :  Recommendations and Planned Interventions: bed mobility training, transfer training, gait training, therapeutic exercises, patient and family training/education, and therapeutic activities      Frequency/Duration: Patient will be followed by physical therapy:  4 times a week to address goals. Recommendation for discharge: (in order for the patient to meet his/her long term goals)  Physical therapy at least 2 days/week in the home     This discharge recommendation:  A follow-up discussion with the attending provider and/or case management is planned    IF patient discharges home will need the following DME: patient owns DME required for discharge         SUBJECTIVE:   Patient stated [facility] are on the same property.     OBJECTIVE DATA SUMMARY:   HISTORY:    Past Medical History:   Diagnosis Date    Arthritis     Cancer ([facility] [address]     vulva    Cerebral artery occlusion with cerebral infarction ([facility] [address]     Hypertension     Shoulder fracture, left      Past Surgical History:   Procedure Laterality Date    HX CHOLECYSTECTOMY      HX GYN         Personal factors and/or comorbidities impacting plan of care:     Home Situation  Home Environment: Private residence  # Steps to Enter: 2  Rails to Enter: Yes  Hand Rails : Left  One/Two Story Residence: One story (one step up to living room)  Living Alone: No  Support Systems: Other Family Member(s) (sister lives next door)  Patient Expects to be Discharged to[de-identified] Home  Current DME Used/Available at Home: Anders Gerryo, rollator, [place], straight, Commode, bedside  Tub or Shower Type: Tub/Shower combination    EXAMINATION/PRESENTATION/DECISION MAKING:   Critical Behavior:  Neurologic State: Alert, Confused  Orientation Level: Oriented to person, Oriented to place, Oriented to time (month and year, day of week)  Cognition: Decreased attention/concentration, Follows commands (tangential in conversation)  Safety/Judgement: Decreased insight into deficits, Decreased awareness of need for safety  Hearing: Auditory  Auditory Impairment: None    Range Of Motion:  AROM: Within functional limits           PROM: Within functional limits           Strength:    Strength: Generally decreased, functional                    Tone & Sensation:   Tone: Normal              Sensation: Intact               Coordination:  Coordination: Within functional limits       Functional Mobility:  Bed Mobility:  Rolling: Independent  Supine to Sit: Supervision     Scooting: Supervision  Transfers:  Sit to Stand: Supervision  Stand to Sit: Supervision                       Balance:   Sitting: Intact  Standing: Impaired  Standing - Static: Good  Standing - Dynamic : Fair  Ambulation/Gait Training:  Distance (ft): 45 Feet (ft)  Assistive Device:  (none)  Ambulation - Level of Assistance: Stand-by assistance        Gait Abnormalities: Decreased step clearance              Speed/Concepcion: Slow  Step Length: Left shortened;Right shortened             Functional Measure:  [facility] AM-PAC®      Basic Mobility Inpatient Short Form (6-Clicks) Version 2  How much HELP from another person do you currently need. .. (If the patient hasn't done an activity recently, how much help from another person do you think they would need if they tried?) Total A Lot A Little None   1. Turning from your back to your side while in a flat bed without using bedrails? []  1 []  2 []  3  [x]  4   2.   Moving from lying on your back to sitting on the side of a flat bed without using bedrails? []  1 []  2 []  3  [x]  4   3. Moving to and from a bed to a chair (including a wheelchair)? []  1 []  2 []  3  [x]  4   4. Standing up from a chair using your arms (e.g. wheelchair or bedside chair)? []  1 []  2 []  3  [x]  4   5. Walking in hospital room? []  1 []  2 [x]  3  []  4   6. Climbing 3-5 steps with a railing? []  1 []  2 [x]  3  []  4     Raw Score: 22/24                            Cutoff score ?171,2,3 had higher odds of discharging home with home health or need of SNF/IPR. [address] Laquita Kell Beverly Bushy. Validity of the AM-PAC 6-Clicks Inpatient Daily Activity and Basic Mobility Short Forms. Physical Therapy Mar 2014, 94 (3) 379-391; DOI: 10.2522/ptj.38416041  2. Lea Araujo. Association of AM-PAC \"6-Clicks\" Basic Mobility and Daily Activity Scores With Discharge Destination. Phys Ther. 2021 Apr 4;101(4):vyxc063. doi: 10.1093/ptj/qxym213. PMID: 20816394. V Talia Mendosa, Amaury D, Nima Troy, Jaime K, Ranjit S. Activity Measure for Post-Acute Care \"6-Clicks\" Basic Mobility Scores Predict Discharge Destination After Acute Care Hospitalization in Select Patient Groups: A Retrospective, Observational Study. Arch Rehabil Res Clin Transl. 2022 Jul 16;4(3):021490. doi: 10.1016/j.arrct. 5012.393646. PMID: 91183872; PMCID: [id number]. 4. Page Millan, Wendy W, Michelle PEREZ. AM-PAC Short Forms Manual 4.0. Revised 2/2020.         Physical Therapy Evaluation Charge Determination   History Examination Presentation Decision-Making   MEDIUM  Complexity : 1-2 comorbidities / personal factors will impact the outcome/ POC  HIGH Complexity : 4+ Standardized tests and measures addressing body structure, function, activity limitation and / or participation in recreation  LOW Complexity : Stable, uncomplicated  LOW Complexity : FOTO score of       Based on the above components, the patient evaluation is determined to be of the following complexity level: LOW     Pain Rating:  No c/o    Activity Tolerance:   Good    After treatment patient left in no apparent distress: On stretcher with transport taking to testing    COMMUNICATION/EDUCATION:   The patients plan of care was discussed with: Occupational therapist and Registered nurse. Fall prevention education was provided and the patient/caregiver indicated understanding., Patient/family have participated as able in goal setting and plan of care. , and Patient/family agree to work toward stated goals and plan of care.     Thank you for this referral.  Nereida Ayers, PT   Time Calculation: 19 mins

## 2023-03-29 NOTE — CONSULTS
Consult  REFERRED BY:  Bernardo López MD    CHIEF COMPLAINT: Memory loss and confusional episodes      Subjective:     Al Washington is a 78 y.o. right-handed  female we are asked to evaluate because of increasing spells of confusion and disorientation and some chronic progressive memory loss over the last year or 2. 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 She was last seen about a year ago in neurology clinic and had some mild cognitive impairment but no clear dementia, but now she seems to be clearly progressing. She had a CTA of the head and neck and a CT of the head that were normal on admission. MRI scan is ordered. B12 thyroid and vitamin D levels are all ordered also. Her EEG was normal this morning and her carotid Doppler was unremarkable. 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 He apparently gives no family history of dementia. She has had no recent head trauma, no complaints of headache, no fever, no meningismus, no toxin exposure, and no other cause for her memory loss and denies any unusual stress or depression.     Past Medical History:   Diagnosis Date    Arthritis     Cancer ([facility] [address]     vulva    Cerebral artery occlusion with cerebral infarction [facility])     Hypertension     Shoulder fracture, left       Past Surgical History:   Procedure Laterality Date    HX CHOLECYSTECTOMY      HX GYN       History reviewed. No pertinent family history. Social History     Tobacco Use    Smoking status: Never    Smokeless tobacco: Never   Substance Use Topics    Alcohol use: No         Current Facility-Administered Medications:     [START ON 3/30/2023] cefTRIAXone (ROCEPHIN) 1 g in 0.9% sodium chloride (MBP/ADV) 50 mL MBP, 1 g, IntraVENous, Q24H, Becky Holts, DO    aspirin chewable tablet 81 mg, 81 mg, Oral, DAILY, Becky Holts, DO, 81 mg at 03/29/23 0070    acetaminophen (TYLENOL) tablet 650 mg, 650 mg, Oral, Q4H PRN **OR** acetaminophen (TYLENOL) solution 650 mg, 650 mg, Per NG tube, Q4H PRN **OR** acetaminophen (TYLENOL) suppository 650 mg, 650 mg, Rectal, Q4H PRN, Becky Holts, DO    enoxaparin (LOVENOX) injection 40 mg, 40 mg, SubCUTAneous, Q24H, Becky Holts, DO, 40 mg at 03/29/23 5297        Allergies   Allergen Reactions    Pcn [Penicillins] Hives    Tape [Adhesive] Other (comments)     Tears skin, redness      MRI Results (most recent):  Results from Hospital Encounter encounter on 02/17/18    MRA NECK WO CONT    Narrative  EXAM:  MRA NECK WO CONT    INDICATION:  Stroke with right-sided weakness, remote    COMPARISON STUDY: None  Noncontrast 2-D time-of-flight MRA of the neck was performed. Multiplanar  reconstructions were obtained. FINDINGS:  . The bilateral subclavian, common carotid, and internal carotid arteries are  patent with no flow-limiting stenosis. NASCET method was utilized for  calculating stenosis. The vertebral arteries are bilaterally patent and are  codominant. Impression  IMPRESSION:  Negative MRA of the neck      Results from Hospital Encounter encounter on 02/17/18    MRA NECK WO CONT    Narrative  EXAM:  MRA NECK WO CONT    INDICATION:  Stroke with right-sided weakness, remote    COMPARISON STUDY: None  Noncontrast 2-D time-of-flight MRA of the neck was performed. Multiplanar  reconstructions were obtained. FINDINGS:  .  The bilateral subclavian, common carotid, and internal carotid arteries are  patent with no flow-limiting stenosis. NASCET method was utilized for  calculating stenosis. The vertebral arteries are bilaterally patent and are  codominant. Impression  IMPRESSION:  Negative MRA of the neck    Review of Systems:  A comprehensive review of systems was negative except for: Constitutional: positive for fatigue and malaise  Neurological: positive for memory problems and episodes of confusion  Behvioral/Psych: positive for possible dementia    Vitals:    03/29/23 0835 03/29/23 0934 03/29/23 1152 03/29/23 1158   BP: (!) 166/82   (!) 172/85   Pulse: 78  76 73   Resp: 19   16   Temp: 97.7 °F (36.5 °C)   98.4 °F (36.9 °C)   SpO2: 98%   100%   Weight:  145 lb 8.1 oz (66 kg)       Objective:     I      NEUROLOGICAL EXAM:    Appearance: The patient is well developed, well nourished, provides a fair history and is in no acute distress. Mental Status: Oriented to place and person, and not the date or the president, patient can remember only 1 of 3 words at 30 seconds, cannot do serial sevens, could spell world backward, and with some difficulty got clock drawing cognitive function is abnormal and speech is fluent and no aphasia or dysarthria. Mood and affect appropriate but mildly confused. Cranial Nerves:   Intact visual fields. Fundi are benign, disc are flat, no lesions seen on funduscopy. ANA, EOM's full, no nystagmus, no ptosis. Facial sensation is normal. Corneal reflexes are not tested. Facial movement is symmetric. Hearing is normal bilaterally. Palate is midline with normal sternocleidomastoid and trapezius muscles are normal. Tongue is midline. Neck without meningismus or bruits  Temporal arteries are not tender or enlarged  TMJ areas are not tender on palpation   Motor:  5/5 strength in upper and lower proximal and distal muscles. Normal bulk and tone. No fasciculations.   Rapid alternating movement is symmetric and intact bilaterally Reflexes:   Deep tendon reflexes 2+/4 and symmetrical.  No babinski or clonus present   Sensory:   Normal to touch, pinprick and vibration and temperature. DSS is intact   Gait:  Not tested because patient on stretcher and Doppler lab   Tremor:   No tremor noted. Cerebellar:  Not tested abnormal cerebellar signs present on Romberg and tandem testing and finger-nose-finger exam.   Neurovascular:  Normal heart sounds and regular rhythm, peripheral pulses decreased, and no carotid bruits. Assessment:     Active Problems:    Amnesia (3/29/2023)      Disturbance of memory (3/29/2023)      Acute alteration in mental status (3/29/2023)      Late onset Alzheimer's disease without behavioral disturbance ([facility] [address] (3/29/2023)      Bilateral carotid artery stenosis (3/29/2023)      Transient ischemic attack involving left internal carotid artery (3/29/2023)        Plan:     Patient most likely has a senile dementia of the Alzheimer's type, more amnestic, and will need neuropsych testing to evaluate this dementia to rule out frontotemporal dementia, and she will probably be a candidate for cognitive enhancing agents, and she needs a neuropsych evaluation to check her level of competency and financial affairs etc. to guide the family on power of 's. Patient was advised not to drive due to her dementia and the [place] state law stating that if there is memory impairment, they need to pass the [facility] 's evaluation to prove they can drive safely. We encouraged the patient stay physically at mentally active, not to eat a Mediterranean type diet, and exercise about half an hour a day of mild exercise. Her Dopplers were reviewed and her EEG was reviewed the EEG was normal and the Doppler was okay also. We will check her J28 levels metabolic studies looking for treatable cause for the memory loss, check her MRI scan, and decide on further treatment evaluation pending results of this clinical course.   She is encouraged take vitamin D every day and multivitamin every day. 70 minutes spent doing all of the review of records, reviewing her MRI scan, reviewing her CAT scan and CTA of the head and neck today, reviewing her Dopplers, and discussing her diagnosis prognosis and treatment options with her in detail today.     Signed By: Carlos Solorio MD     March 29, 2023       CC: Ailyn Baltazar [address]: [phone]

## 2023-03-30 VITALS
RESPIRATION RATE: 18 BRPM | WEIGHT: 145.5 LBS | HEART RATE: 85 BPM | BODY MASS INDEX: 24.98 KG/M2 | DIASTOLIC BLOOD PRESSURE: 74 MMHG | TEMPERATURE: 97.5 F | OXYGEN SATURATION: 97 % | SYSTOLIC BLOOD PRESSURE: 135 MMHG

## 2023-03-30 LAB
25(OH)D3 SERPL-MCNC: 70.1 NG/ML (ref 30–100)
ANION GAP SERPL CALC-SCNC: 5 MMOL/L (ref 5–15)
BACTERIA SPEC CULT: NORMAL
BUN SERPL-MCNC: 23 MG/DL (ref 6–20)
BUN/CREAT SERPL: 33 (ref 12–20)
CALCIUM SERPL-MCNC: 9.2 MG/DL (ref 8.5–10.1)
CHLORIDE SERPL-SCNC: 111 MMOL/L (ref 97–108)
CHOLEST SERPL-MCNC: 142 MG/DL
CK SERPL-CCNC: 56 U/L (ref 26–192)
CO2 SERPL-SCNC: 27 MMOL/L (ref 21–32)
CREAT SERPL-MCNC: 0.69 MG/DL (ref 0.55–1.02)
ERYTHROCYTE [DISTWIDTH] IN BLOOD BY AUTOMATED COUNT: 13.2 % (ref 11.5–14.5)
ERYTHROCYTE [SEDIMENTATION RATE] IN BLOOD: 27 MM/HR (ref 0–30)
EST. AVERAGE GLUCOSE BLD GHB EST-MCNC: 114 MG/DL
FOLATE SERPL-MCNC: 19.3 NG/ML (ref 5–21)
GLUCOSE SERPL-MCNC: 104 MG/DL (ref 65–100)
HBA1C MFR BLD: 5.6 % (ref 4–5.6)
HCT VFR BLD AUTO: 40.6 % (ref 35–47)
HDLC SERPL-MCNC: 63 MG/DL
HDLC SERPL: 2.3 (ref 0–5)
HGB BLD-MCNC: 12.8 G/DL (ref 11.5–16)
LDLC SERPL CALC-MCNC: 58.8 MG/DL (ref 0–100)
MCH RBC QN AUTO: 28.5 PG (ref 26–34)
MCHC RBC AUTO-ENTMCNC: 31.5 G/DL (ref 30–36.5)
MCV RBC AUTO: 90.4 FL (ref 80–99)
NRBC # BLD: 0 K/UL (ref 0–0.01)
NRBC BLD-RTO: 0 PER 100 WBC
PLATELET # BLD AUTO: 287 K/UL (ref 150–400)
PMV BLD AUTO: 9.6 FL (ref 8.9–12.9)
POTASSIUM SERPL-SCNC: 4 MMOL/L (ref 3.5–5.1)
RBC # BLD AUTO: 4.49 M/UL (ref 3.8–5.2)
SERVICE CMNT-IMP: NORMAL
SODIUM SERPL-SCNC: 143 MMOL/L (ref 136–145)
TRIGL SERPL-MCNC: 101 MG/DL (ref ?–150)
TSH SERPL DL<=0.05 MIU/L-ACNC: 2.41 UIU/ML (ref 0.36–3.74)
VIT B12 SERPL-MCNC: 570 PG/ML (ref 193–986)
VLDLC SERPL CALC-MCNC: 20.2 MG/DL
WBC # BLD AUTO: 6.5 K/UL (ref 3.6–11)

## 2023-03-30 PROCEDURE — 82746 ASSAY OF FOLIC ACID SERUM: CPT

## 2023-03-30 PROCEDURE — 82607 VITAMIN B-12: CPT

## 2023-03-30 PROCEDURE — 94760 N-INVAS EAR/PLS OXIMETRY 1: CPT

## 2023-03-30 PROCEDURE — 99232 SBSQ HOSP IP/OBS MODERATE 35: CPT | Performed by: PSYCHIATRY & NEUROLOGY

## 2023-03-30 PROCEDURE — 83036 HEMOGLOBIN GLYCOSYLATED A1C: CPT

## 2023-03-30 PROCEDURE — 74011250636 HC RX REV CODE- 250/636: Performed by: STUDENT IN AN ORGANIZED HEALTH CARE EDUCATION/TRAINING PROGRAM

## 2023-03-30 PROCEDURE — 74011250637 HC RX REV CODE- 250/637: Performed by: STUDENT IN AN ORGANIZED HEALTH CARE EDUCATION/TRAINING PROGRAM

## 2023-03-30 PROCEDURE — 82306 VITAMIN D 25 HYDROXY: CPT

## 2023-03-30 PROCEDURE — 36415 COLL VENOUS BLD VENIPUNCTURE: CPT

## 2023-03-30 PROCEDURE — 86038 ANTINUCLEAR ANTIBODIES: CPT

## 2023-03-30 PROCEDURE — 74011000258 HC RX REV CODE- 258: Performed by: STUDENT IN AN ORGANIZED HEALTH CARE EDUCATION/TRAINING PROGRAM

## 2023-03-30 PROCEDURE — 80061 LIPID PANEL: CPT

## 2023-03-30 PROCEDURE — 80048 BASIC METABOLIC PNL TOTAL CA: CPT

## 2023-03-30 PROCEDURE — 85652 RBC SED RATE AUTOMATED: CPT

## 2023-03-30 PROCEDURE — 85027 COMPLETE CBC AUTOMATED: CPT

## 2023-03-30 PROCEDURE — 84443 ASSAY THYROID STIM HORMONE: CPT

## 2023-03-30 PROCEDURE — 82550 ASSAY OF CK (CPK): CPT

## 2023-03-30 RX ORDER — LANOLIN ALCOHOL/MO/W.PET/CERES
1000 CREAM (GRAM) TOPICAL DAILY
Qty: 30 TABLET | Refills: 0 | Status: SHIPPED | OUTPATIENT
Start: 2023-03-30 | End: 2023-04-29

## 2023-03-30 RX ORDER — DONEPEZIL HYDROCHLORIDE 5 MG/1
5 TABLET, FILM COATED ORAL
Status: DISCONTINUED | OUTPATIENT
Start: 2023-03-30 | End: 2023-03-30 | Stop reason: HOSPADM

## 2023-03-30 RX ORDER — CIPROFLOXACIN 500 MG/1
500 TABLET ORAL 2 TIMES DAILY
Qty: 10 TABLET | Refills: 0 | Status: SHIPPED | OUTPATIENT
Start: 2023-03-30 | End: 2023-04-04

## 2023-03-30 RX ADMIN — ENOXAPARIN SODIUM 40 MG: 100 INJECTION SUBCUTANEOUS at 09:07

## 2023-03-30 RX ADMIN — ASPIRIN 81 MG: 81 TABLET, CHEWABLE ORAL at 09:06

## 2023-03-30 RX ADMIN — SODIUM CHLORIDE 1 G: 900 INJECTION INTRAVENOUS at 09:10

## 2023-03-30 NOTE — PROGRESS NOTES
End of Shift Note    Bedside shift change report given to Quang Haas RN (oncoming nurse) by Tanvir Forbes RN (offgoing nurse). Report included the following information SBAR, Kardex, and ED Summary    Shift worked:  Nights   Shift summary and any significant changes:     MRI completed. Pt VSS. No significant events over night. Concerns for physician to address:  NONE   Zone phone for oncoming shift:   [phone]       Patient Clehenri Minder  78 y.o.  3/29/2023  1:30 AM by Katie Roche DO. Letty Arrieta was admitted from Home    Problem List  Patient Active Problem List    Diagnosis Date Noted    Amnesia 03/29/2023    Disturbance of memory 03/29/2023    Acute alteration in mental status 03/29/2023    Late onset Alzheimer's disease without behavioral disturbance ([facility] [address] 03/29/2023    Bilateral carotid artery stenosis 03/29/2023    Transient ischemic attack involving left internal carotid artery 03/29/2023     Past Medical History:   Diagnosis Date    Arthritis     Cancer ([facility] [address]     vulva    Cerebral artery occlusion with cerebral infarction [facility])     Hypertension     Shoulder fracture, left        Core Measures:  CVA: Yes Yes  CHF:No No  PNA:No No    Activity:  Activity Level:  Up with Assistance  Number times ambulated in hallways past shift: 0  Number of times OOB to chair past shift: 5    Cardiac:   Cardiac Monitoring: Yes      Cardiac Rhythm: Sinus Rhythm    Access:   Current line(s): PIV     Genitourinary:   Urinary status: voiding    Respiratory:   O2 Device: None (Room air)  Chronic home O2 use?: N/A  Incentive spirometer at bedside: N/A       GI:  Last Bowel Movement Date: 03/27/23  Current diet:  ADULT DIET Regular; Low Fat/Low Chol/High Fiber/2 gm Na  Passing flatus: YES  Tolerating current diet: YES       Pain Management:   Patient states pain is manageable on current regimen: N/A    Skin:  Cory Score: 23  Interventions: float heels and increase time out of bed    Patient Safety:  Fall Score:    Interventions: bed/chair alarm, assistive device (walker, cane, etc), and gripper socks     @Rollbelt  @dexterity to release roll belt  Yes/No ( must document dexterity  here by stating Yes or No here, otherwise this is a restraint and must follow restraint documentation policy.)    DVT prophylaxis:  DVT prophylaxis Med- Yes  DVT prophylaxis SCD or NADIR- No     Active Consults:  IP CONSULT TO NEUROLOGY    Length of Stay:  Expected LOS: 2d 12h  Actual LOS: 1  Discharge Plan: No      Karan Tillman RN

## 2023-03-30 NOTE — ED PROVIDER NOTES
[facility] [address] [facility]       Pt Name: Letty Arrieta  MRN: [id number]  [place] 1945  Date of evaluation: 3/29/2023  Provider: Shamar Collazo MD   PCP: Bridger Neil MD  Note Started: 11:05 AM 3/30/23     CHIEF COMPLAINT       Chief Complaint   Patient presents with    Altered mental status     Pt arrives for c/o \"not feeling right\". Pt brought in by her son and her sister, who describe that pt has been forgetful and altered since 1700 last night. Pt does not remember driving herself to a baseball game. Also does not remember what she did in recent days. Prior to tonight, pt was able to remember these details. Pt also reports increasing forgetfulness recently. HISTORY OF PRESENT ILLNESS: 1 or more elements      History From: Patient, sister, and son  HPI Limitations : Altered Mental Status     Letty Arrieta is a 78 y.o. female with history of hypertension, previous CVA, arthritis who presents to ED with altered mental status. Patient lives with her sister. Patient and sister report that patient has had increasing and intermittent confusion for the last several months. Tonight, however, sister reports that patient was extremely confused. She left the house and drove herself to her grandsons baseball game at about 5 PM.  When she came back to the house, sister states she sat in the car in the driveway for a prolonged length of time and then came in and did not remember going to the baseball game at all. Sister confirmed with her son that patient had been at the baseball game and acting normally there. Patient continues to deny that she was there and does not remember that part of her day. She actually becomes argumentative and impatient that we keep talking about her going there when she thinks it did not happen. Sister states that, although patient has had some mild confusion of late, the severity of today's episode is a significant change.   Patient denies headache, chest pain, shortness of breath, nausea, vomiting, abdominal pain. She denies any vision changes, numbness, tingling, weakness. REVIEW OF SYSTEMS      Review of Systems     Positives and Pertinent negatives as per HPI. PAST HISTORY     Past Medical History:  Past Medical History:   Diagnosis Date    Arthritis     Cancer ([facility] [address]     vulva    Cerebral artery occlusion with cerebral infarction ([facility] [address]     Hypertension     Shoulder fracture, left        Past Surgical History:  Past Surgical History:   Procedure Laterality Date    HX CHOLECYSTECTOMY      HX GYN         Family History:  History reviewed. No pertinent family history. Social History:  Social History     Tobacco Use    Smoking status: Never    Smokeless tobacco: Never   Vaping Use    Vaping Use: Never used   Substance Use Topics    Alcohol use: No    Drug use: No       Allergies: Allergies   Allergen Reactions    Pcn [Penicillins] Hives    Tape [Adhesive] Other (comments)     Tears skin, redness       CURRENT MEDICATIONS      Current Discharge Medication List        CONTINUE these medications which have NOT CHANGED    Details   omeprazole (PRILOSEC) 40 mg capsule Take 1 Capsule by mouth daily. rosuvastatin (CRESTOR) 10 mg tablet take 1 tablet by mouth once daily  Refills: 0      meclizine (ANTIVERT) 25 mg tablet Take 1 Tab by mouth three (3) times daily as needed for Dizziness. Qty: 20 Tab, Refills: 0      ascorbic acid, vitamin C, (VITAMIN C) 500 mg tablet Take  by mouth daily. cholecalciferol (VITAMIN D3) 400 unit tab tablet Take  by mouth daily. docusate sodium (COLACE) 100 mg capsule Take 100 mg by mouth two (2) times daily as needed. peg 400-propylene glycol (SYSTANE) 0.4-0.3 % drop Administer  to left eye as needed. multivitamin (ONE A DAY) tablet Take 1 Tab by mouth daily. aspirin 81 mg tablet Take 81 mg by mouth daily.              PHYSICAL EXAM      ED Triage Vitals   ED Encounter Vitals Group BP 03/29/23 0133 (!) 179/75      Pulse (Heart Rate) 03/29/23 0133 82      Resp Rate 03/29/23 0133 20      Temp 03/29/23 0545 98.1 °F (36.7 °C)      Temp src --       O2 Sat (%) 03/29/23 0133 95 %      Weight 03/29/23 0934 145 lb 8.1 oz      Height --         Physical Exam  Vitals and nursing note reviewed. Constitutional:       General: She is not in acute distress. Appearance: She is well-developed and normal weight. HENT:      Head: Normocephalic. Mouth/Throat:      Pharynx: Oropharynx is clear. Eyes:      Pupils: Pupils are equal, round, and reactive to light. Cardiovascular:      Rate and Rhythm: Normal rate and regular rhythm. Heart sounds: Normal heart sounds. Pulmonary:      Effort: Pulmonary effort is normal.      Breath sounds: No wheezing or rales. Abdominal:      General: There is no distension. Palpations: Abdomen is soft. Tenderness: There is no abdominal tenderness. Musculoskeletal:         General: No swelling or tenderness. Normal range of motion. Cervical back: Normal range of motion and neck supple. Skin:     General: Skin is warm and dry. Coloration: Skin is not pale. Neurological:      Mental Status: She is alert. Cranial Nerves: No cranial nerve deficit, dysarthria or facial asymmetry. Sensory: No sensory deficit. Motor: No weakness. Gait: Gait normal.      Comments: Oriented to person and place but not time.    Psychiatric:         Behavior: Behavior normal.      Comments: Anxious          DIAGNOSTIC RESULTS   LABS:     Recent Results (from the past 48 hour(s))   GLUCOSE, POC    Collection Time: 03/29/23  1:31 AM   Result Value Ref Range    Glucose (POC) 148 (H) 65 - 117 mg/dL    Performed by Lani Mistry (ARTURO RN)    URINALYSIS W/ REFLEX CULTURE    Collection Time: 03/29/23  1:43 AM    Specimen: Urine   Result Value Ref Range    Color YELLOW/STRAW      Appearance CLEAR CLEAR      Specific gravity 1.014      pH (UA) 6.0 5.0 - 8.0      Protein Negative NEG mg/dL    Glucose Negative NEG mg/dL    Ketone Negative NEG mg/dL    Bilirubin Negative NEG      Blood Negative NEG      Urobilinogen 1.0 0.2 - 1.0 EU/dL    Nitrites Negative NEG      Leukocyte Esterase LARGE (A) NEG      WBC 10-20 0 - 4 /hpf    RBC 0-5 0 - 5 /hpf    Epithelial cells FEW FEW /lpf    Bacteria 1+ (A) NEG /hpf    UA:UC IF INDICATED URINE CULTURE ORDERED (A) CNI     EKG, 12 LEAD, INITIAL    Collection Time: 03/29/23  2:06 AM   Result Value Ref Range    Ventricular Rate 75 BPM    Atrial Rate 75 BPM    P-R Interval 200 ms    QRS Duration 144 ms    Q-T Interval 422 ms    QTC Calculation (Bezet) 471 ms    Calculated P Axis 66 degrees    Calculated R Axis -61 degrees    Calculated T Axis 19 degrees    Diagnosis       Normal sinus rhythm  Right bundle branch block  Left anterior fascicular block  ** Bifascicular block **  Minimal voltage criteria for LVH, may be normal variant  When compared with ECG of 05-DEC-2017 14:50,  No significant change was found  Confirmed by Mirna Chiang ([id number]) on 3/29/2023 5:38:27 PM     CBC WITH AUTOMATED DIFF    Collection Time: 03/29/23  2:13 AM   Result Value Ref Range    WBC 7.6 3.6 - 11.0 K/uL    RBC 4.65 3.80 - 5.20 M/uL    HGB 14.1 11.5 - 16.0 g/dL    HCT 41.3 35.0 - 47.0 %    MCV 88.8 80.0 - 99.0 FL    MCH 30.3 26.0 - 34.0 PG    MCHC 34.1 30.0 - 36.5 g/dL    RDW 12.8 11.5 - 14.5 %    PLATELET 917 841 - 971 K/uL    MPV 10.0 8.9 - 12.9 FL    NRBC 0.0 0  WBC    ABSOLUTE NRBC 0.00 0.00 - 0.01 K/uL    NEUTROPHILS 69 32 - 75 %    LYMPHOCYTES 21 12 - 49 %    MONOCYTES 8 5 - 13 %    EOSINOPHILS 1 0 - 7 %    BASOPHILS 1 0 - 1 %    IMMATURE GRANULOCYTES 0 0.0 - 0.5 %    ABS. NEUTROPHILS 5.3 1.8 - 8.0 K/UL    ABS. LYMPHOCYTES 1.6 0.8 - 3.5 K/UL    ABS. MONOCYTES 0.6 0.0 - 1.0 K/UL    ABS. EOSINOPHILS 0.1 0.0 - 0.4 K/UL    ABS. BASOPHILS 0.1 0.0 - 0.1 K/UL    ABS. IMM.  GRANS. 0.0 0.00 - 0.04 K/UL    DF AUTOMATED     METABOLIC PANEL, COMPREHENSIVE Collection Time: 03/29/23  2:13 AM   Result Value Ref Range    Sodium 142 136 - 145 mmol/L    Potassium 3.4 (L) 3.5 - 5.1 mmol/L    Chloride 108 97 - 108 mmol/L    CO2 31 21 - 32 mmol/L    Anion gap 3 (L) 5 - 15 mmol/L    Glucose 130 (H) 65 - 100 mg/dL    BUN 17 6 - 20 MG/DL    Creatinine 0.73 0.55 - 1.02 MG/DL    BUN/Creatinine ratio 23 (H) 12 - 20      eGFR >60 >60 ml/min/1.73m2    Calcium 9.3 8.5 - 10.1 MG/DL    Bilirubin, total 0.3 0.2 - 1.0 MG/DL    ALT (SGPT) 22 12 - 78 U/L    AST (SGOT) 16 15 - 37 U/L    Alk. phosphatase 66 45 - 117 U/L    Protein, total 7.4 6.4 - 8.2 g/dL    Albumin 3.7 3.5 - 5.0 g/dL    Globulin 3.7 2.0 - 4.0 g/dL    A-G Ratio 1.0 (L) 1.1 - 2.2     AMMONIA    Collection Time: 03/29/23  2:13 AM   Result Value Ref Range    Ammonia, plasma 13 <32 UMOL/L   TROPONIN-HIGH SENSITIVITY    Collection Time: 03/29/23  2:13 AM   Result Value Ref Range    Troponin-High Sensitivity 15 0 - 51 ng/L     EKG at 2:06 AM on 3/29/2023 interpreted by me: Normal sinus rhythm, 75 bpm, right bundle branch block, normal AR interval, left axis deviation, nonspecific ST changes     RADIOLOGY:  Non-plain film images such as CT, Ultrasound and MRI are read by the radiologist. Plain radiographic images are visualized and preliminarily interpreted by the ED Provider with the below findings:     Portable chest x-ray at 3:06 AM on 3/29/2023 interpreted by me: No pneumonia, no large pleural effusion, no pneumonia     Interpretation per the Radiologist below, if available at the time of this note:     Study Result    Narrative & Impression   INDICATION: ams     EXAM:  HEAD CT WITHOUT CONTRAST     COMPARISON: MRI February 17, 2018     TECHNIQUE:  Routine noncontrast axial head CT was performed. Sagittal and  coronal reconstructions were generated. CT dose reduction was achieved through use of a standardized protocol tailored  for this examination and automatic exposure control for dose modulation.      FINDINGS: Ventricles: Midline, no hydrocephalus. Intracranial Hemorrhage: None. Brain Parenchyma/Brainstem: Chronic white matter hypodensities. Basal Cisterns: Normal.  Paranasal Sinuses: Visualized sinuses are clear. Additional Comments: N/A. IMPRESSION     No acute process. Study Result    Narrative & Impression   *PRELIMINARY REPORT*     No acute large vessel occlusion. Cerebral perfusion not performed     Preliminary report was provided by Dr. An Petersen the on-call radiologist.     Final report to follow. *END PRELIMINARY REPORT*     EXAM: CTA HEAD NECK W CONT     INDICATION: aMS, confusion     COMPARISON: None. CONTRAST: 100 mL of Isovue-370. TECHNIQUE:  Unenhanced  images were obtained to localize the volume for  acquisition. Multislice helical axial CT angiography was performed from the  aortic arch to the top of the head during uneventful rapid bolus intravenous  contrast administration. Coronal and sagittal reformations and 3D post  processing was performed. CT dose reduction was achieved through use of a  standardized protocol tailored for this examination and automatic exposure  control for dose modulation. FINDINGS:     DELAYED ENHANCEMENT HEAD CT:   No intra or extra-axial mass or collection. Ventricles are normal in size and  configuration. The basal cisterns are patent. Dural venous sinuses are patent. No abnormal parenchymal or meningeal  enhancement. Mastoid air cells and paranasal sinuses are clear. CTA NECK:  Great vessels: Normal arch anatomy with the origins patent. Right subclavian artery: Patent  Left subclavian artery: Patent   Right common carotid artery: Patent   Left common carotid artery: Patent   Cervical right internal carotid artery: Patent with no significant stenosis by  NASCET criteria. Cervical left internal carotid artery: Patent with no significant stenosis by  NASCET criteria.   Right vertebral artery: Patent  Left vertebral artery: Patent  The lung apices are clear. The thyroid is homogeneous. No cervical  lymphadenopathy. Mucus retention cysts in the left maxillary sinus. CTA HEAD:  Right cavernous internal carotid artery: Patent  Left cavernous internal carotid artery: Patent  Anterior cerebral arteries: Patent  Anterior communicating artery: Patent  Right middle cerebral artery: Patent  Left middle cerebral artery: Patent  Posterior communicating arteries: Patent  Posterior cerebral arteries: Patent  Basilar artery: Patent  Distal vertebral arteries: Patent  No evidence for intracranial aneurysm or hemodynamically significant stenosis. IMPRESSION  No acute large vessel occlusion or hemodynamically significant stenosis. .      Study Result    Narrative & Impression   INDICATION: Chest pain       EXAM:  AP CHEST RADIOGRAPH     COMPARISON: 12/5/2017     FINDINGS:     AP portable view of the chest demonstrates a normal cardiomediastinal  silhouette. There is no edema, effusion, consolidation, or pneumothorax. The  osseous structures are unremarkable. IMPRESSION  No acute process. SCREENINGS   NIH Stroke Scale  Interval: Other (comment) (NEURO)  Level of Conciousness (1a): Alert  LOC Questions (1b): (!) Answers one question correctly (TIME)  LOC Commands (1c): Performs both tasks correctly  Best Gaze (2): Normal  Visual (3): No visual loss  Facial Palsy (4): Normal symmetrical movement  Motor Arm, Left (5a): No drift  Motor Arm, Right (5b): No drift  Motor Leg, Left (6a): No drift  Motor Leg, Right (6b):  No drift  Limb Ataxia (7): Absent  Sensory (8): Normal  Best Language (9): No aphasia  Dysarthria (10): Normal  Extinction and Inattention (11): No abnormality  Total: 1         No data recorded         EMERGENCY DEPARTMENT COURSE and DIFFERENTIAL DIAGNOSIS/MDM   Vitals:    Vitals:    03/29/23 2242 03/30/23 0245 03/30/23 0738 03/30/23 0800   BP: 125/74 118/65 135/74    Pulse: 78 72 70 78   Resp: 15 15 18    Temp: 98.6 °F (37 °C) 98.2 °F (36.8 °C) 97.5 °F (36.4 °C)    SpO2: 95% 93% 97%    Weight:            Patient was given the following medications:  Medications                                 cefTRIAXone (ROCEPHIN) 1 g in 0.9% sodium chloride (MBP/ADV) 50 mL MBP (1 g IntraVENous New Bag 3/29/23 7982)   potassium bicarb-citric acid (EFFER-K) tablet 40 mEq (40 mEq Oral Given 3/29/23 7907)       CONSULTS: (Who and What was discussed)  IP CONSULT TO NEUROLOGY            Records Reviewed (source and summary of external notes):   Reviewed report from MRI of brain on 2/17/2018. IMPRESSION  IMPRESSION:    1. No acute findings. 2. Chronic left pontine lacunar infarcts. 3. Moderate cerebral white matter signal abnormality and chronic white matter  lacunar infarcts consistent with chronic small vessel ischemic change. Reviewed outpatient neurology note from 7/18/2022. Patient seen by Lary Mancilla for follow-up regarding previous CVA. Complained of headaches on the right side of her head, numbness and tingling in her extremities and ringing in her ears. Treated with gabapentin for paresthesias and referred to physical therapy    CC/HPI Summary, DDx, ED Course, and Reassessment:   Patient presents to the ED with an episode of significant confusion. Does not remember driving to baseball game and watching her grandson play. History of previous stroke and worsening confusion over the last several months. Differential diagnosis includes TIA, CVA, transient global amnesia, dementia, metabolic encephalopathy, UTI    We will obtain head CT and CTA of head and neck. Will obtain CBC, CMP, UA, troponin, ammonia    Labs and imaging reviewed. CBC is unremarkable. CMP shows mild hypokalemia with potassium of 3.4. High-sensitivity troponin is 15. Ammonia is normal at 13.  UA shows large leuk esterase, 10-20 white blood cells, and 1+ bacteria. Will treat with Rocephin for possible UTI. Head CT and CTA of head and neck did not show any acute findings. Will admit to hospitalist for MRI and neurologic evaluation. FINAL IMPRESSION     1. Altered mental status, unspecified altered mental status type          DISPOSITION/PLAN   Admitted    I am the Primary Clinician of Record. Slime Beauchamp MD (electronically signed)    (Please note that parts of this dictation were completed with voice recognition software. Quite often unanticipated grammatical, syntax, homophones, and other interpretive errors are inadvertently transcribed by the computer software. Please disregards these errors.  Please excuse any errors that have escaped final proofreading.)

## 2023-03-30 NOTE — PROGRESS NOTES
Called patients son Eli Munoz; Case management stated this would be the discharge caregiver) to set up transportation. Left voicemail.

## 2023-03-30 NOTE — PROCEDURES
[address]  EEG    Name:  Jose De Jesus Murillo  MR#:  [id number]  :  1945  ACCOUNT #:  [de-identified]  DATE OF SERVICE:  2023    CLINICAL INDICATION:  The patient is a 51-year-old female with a history of sudden confusion, sudden spells of altered mental status. Possible passing out, rule out seizures, rule out cortical abnormality. EEG CLASSIFICATION:  On this patient is essentially normal, awake and asleep. DESCRIPTION OF THE RECORD:  The background of this recording contains a posteriorly located occipital alpha rhythm of 8-9 Hz that does attenuate some with eye opening. Throughout the recording, there was no clear areas of focal slowing, no clear spike or spike-and-wave discharges seen and no electrographic or dysrhythmic spells seen. The patient did enter brief states of sleep with K complexes and sleep spindles seen in the central head regions. Hyperventilation was not performed. Photic stimulation produced a mild driving response in the posterior head regions. INTERPRETATION:  This is a normal electroencephalogram, awake and asleep showing no clear areas of focal slowing, no recorded electrographic or dysrhythmic spells. Clinical correlation recommended.         Willie Faye MD      [id number]  D:  2023 22:42  T:  2023 3:17  JOB #:  [id number]  CC:  Uziel Cao MD

## 2023-03-30 NOTE — DISCHARGE SUMMARY
Hospitalist Discharge Summary     Patient ID:  Destini Mata  [id number]  88 y.o.  1945  3/29/2023    PCP on record: James Peter MD    Admit date: 3/29/2023  Discharge date and time: 3/30/2023    DISCHARGE DIAGNOSIS:    Amnesia   Disturbance of memory   Acute alteration in mental status   Late onset Alzheimer's disease without behavioral disturbance Bilateral carotid artery stenosis   Transient ischemic attack involving left internal carotid artery   Concern for UTI  Essential hypertension  Osteoarthritis    CONSULTATIONS:  IP CONSULT TO NEUROLOGY    Excerpted HPI from H&P of Robbin Hernandez DO:  HISTORY OF PRESENT ILLNESS:     Karl Lambert is a 78 y.o.  female with pertinent past medical history as listed below who presents accompanied by sister and grandson with concerns for declining mental status. They report patient has been more forgetful lately and suspect she has underlying dementia, but has had 2 overtly amnesic episodes recently. The first being this past weekend when she attended a  and has no recollection of doing so. Second occurred yesterday when she attended her grandson's baseball game and has no memory of attending the game or driving to or from the event. She denies any other complaints. UA concerning for UTI and on questioning she does admit to some dysuria. ROS otherwise negative. She presents with disorganized medication bag with conflicting medications raising concern for polypharmacy. In the ED, patient afebrile and hemodynamically stable (hypertensive 170s/70s), saturating mid 90s on room air. ECG demonstrates normal sinus rhythm with bifascicular block. CT head negative for acute process CTA head negative for acute process. CXR demonstrates no acute process.   Labs demonstrate: UA concerning for UTI (leukocyte Estrace, 1+ bacteria), high sensitive troponin 15, sodium 142, potassium 3.4, glucose 130, BUN 17, creatinine 0.73, ammonia 13, CBC unremarkable. Patient given ceftriaxone for UTI. We were asked to admit for work up and evaluation of the above problems. ______________________________________________________________________  DISCHARGE SUMMARY/HOSPITAL COURSE:  for full details see H&P, daily progress notes, labs, consult notes. Neurology was consulted this admission and agree with possibility of senile dementia, Alzheimer's type. CT and MRI brain without acute changes. Continue healthy Mediterranean diet, exercise, and vitamin supplementation. Family informed she should no longer be driving. They will take 's license and keys. Patient was also made aware. Otherwise patient and family informed of need for follow up with PCP and neuropsychiatry. _______________________________________________________________________  Patient seen and examined by me on discharge day. Pertinent Findings:  Gen:    Not in distress  Chest: Clear lungs  CVS:   Regular rhythm. No edema  Abd:  Soft, not distended, not tender  Neuro:  Alert  _______________________________________________________________________  DISCHARGE MEDICATIONS:   Current Discharge Medication List        START taking these medications    Details   ciprofloxacin HCl (Cipro) 500 mg tablet Take 1 Tablet by mouth two (2) times a day for 5 days. Qty: 10 Tablet, Refills: 0  Start date: 3/30/2023, End date: 4/4/2023           CONTINUE these medications which have NOT CHANGED    Details   omeprazole (PRILOSEC) 40 mg capsule Take 1 Capsule by mouth daily. rosuvastatin (CRESTOR) 10 mg tablet take 1 tablet by mouth once daily  Refills: 0      meclizine (ANTIVERT) 25 mg tablet Take 1 Tab by mouth three (3) times daily as needed for Dizziness. Qty: 20 Tab, Refills: 0      ascorbic acid, vitamin C, (VITAMIN C) 500 mg tablet Take  by mouth daily. cholecalciferol (VITAMIN D3) 400 unit tab tablet Take  by mouth daily.       docusate sodium (COLACE) 100 mg capsule Take 100 mg by mouth two (2) times daily as needed. peg 400-propylene glycol (SYSTANE) 0.4-0.3 % drop Administer  to left eye as needed. multivitamin (ONE A DAY) tablet Take 1 Tab by mouth daily. aspirin 81 mg tablet Take 81 mg by mouth daily. Patient Follow Up Instructions: Activity: Activity as tolerated, PT/OT home health  Diet: Cardiac Diet  Wound Care: None needed    Follow-up with PCP in 1-2 weeks.  Neuropsychiatry when able to be scheduled for new patient appointment  Follow-up tests/labs   Follow-up Information       Follow up With Specialties Details Why Contact [address]  Follow up in 6 day(s) 1 PM APRIL 5th follow up appt ( Dr Martha Lomeli no longer with practice [phone]    Kaylene Tate, [address]   [address]  [phone]            ________________________________________________________________    Risk of deterioration: Low    Condition at Discharge:  Stable  __________________________________________________________________    Disposition  Home with family and home health services    ____________________________________________________________________    Code Status: Full Code  ___________________________________________________________________      Total time in minutes spent coordinating this discharge (includes going over instructions, follow-up, prescriptions, and preparing report for sign off to her PCP) :  >30 minutes    Signed:  Nydia Patricio MD

## 2023-03-30 NOTE — PROGRESS NOTES
1211 pm     3-30-23 Spoke with son Chetna Gaxiola and sister. Agreeable to home services per Chetna Gaxiola-  any agency. He is aware no driving was discussed-  he states MD spoke with him. Referral sent to home health-  son is agreeable to any agency. This patient was driving prior to this admit and going on outings alone. Per sister forgetfulness is not new. I made follow up appt. Medicare IM 3-29-23. Nursing updated.      Lilian Perez RN   1213 pm   Care manager

## 2023-03-30 NOTE — PROGRESS NOTES
Spiritual Care Partner Volunteer visited patient at [facility] in  [place] on 3/30/2023   Documented by:     TEVIN Banks Sa, [facility], Staff [address]    [address] Paging Service  [phone]

## 2023-03-30 NOTE — DISCHARGE INSTRUCTIONS
All of your medications from before your hospitalization are the same EXCEPT: add cipro for 5 days to treat UTI. Please take all of your medications as prescribed. If prescribed any medications, please read all pharmacy instructions and inserts. Inform your doctor and pharmacist about all other medications and alternative therapies. Please follow up with your PCP in 1-2 weeks to be reassessed after your hospital stay. Please also follow up with Neuropsychiatry at [facility] [address], [place], [address] and [phone]. You can also use Google to find a neuropsychiatrist that is right for you and your family. If you start feeling any symptoms similar to what brought you into the hospital, please come back to the ED to be re-evaluated.

## 2023-03-31 LAB — ANA SER QL: POSITIVE

## 2023-03-31 NOTE — PROGRESS NOTES
Consult  REFERRED BY:  Cecilio Rock MD    CHIEF COMPLAINT: Memory loss and confusional episodes      Subjective:     Ashkan Miller is a 78 y.o. right-handed  female we are asked to evaluate because of increasing spells of confusion and disorientation and some chronic progressive memory loss over the last year or 2. 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 She was last seen about a year ago in neurology clinic and had some mild cognitive impairment but no clear dementia, but now she seems to be clearly progressing. She had a CTA of the head and neck and a CT of the head that were normal on admission. MRI scan is ordered. B12 thyroid and vitamin D levels are all ordered also. Her EEG was normal this morning and her carotid Doppler was unremarkable. 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 He apparently gives no family history of dementia. She has had no recent head trauma, no complaints of headache, no fever, no meningismus, no toxin exposure, and no other cause for her memory loss and denies any unusual stress or depression.     Past Medical History:   Diagnosis Date    Arthritis     Cancer ([facility] [address]     vulva    Cerebral artery occlusion with cerebral infarction [facility])     Hypertension     Shoulder fracture, left       Past Surgical History:   Procedure Laterality Date    HX CHOLECYSTECTOMY      HX GYN       History reviewed. No pertinent family history. Social History     Tobacco Use    Smoking status: Never    Smokeless tobacco: Never   Substance Use Topics    Alcohol use: No       No current facility-administered medications for this encounter. Current Outpatient Medications:     ciprofloxacin HCl (Cipro) 500 mg tablet, Take 1 Tablet by mouth two (2) times a day for 5 days. , Disp: 10 Tablet, Rfl: 0    cyanocobalamin 1,000 mcg tablet, Take 1 Tablet by mouth daily for 30 days. , Disp: 30 Tablet, Rfl: 0    omeprazole (PRILOSEC) 40 mg capsule, Take 1 Capsule by mouth daily. , Disp: , Rfl:     rosuvastatin (CRESTOR) 10 mg tablet, take 1 tablet by mouth once daily, Disp: , Rfl: 0    meclizine (ANTIVERT) 25 mg tablet, Take 1 Tab by mouth three (3) times daily as needed for Dizziness. , Disp: 20 Tab, Rfl: 0    ascorbic acid, vitamin C, (VITAMIN C) 500 mg tablet, Take  by mouth daily. , Disp: , Rfl:     cholecalciferol (VITAMIN D3) 400 unit tab tablet, Take  by mouth daily. , Disp: , Rfl:     docusate sodium (COLACE) 100 mg capsule, Take 100 mg by mouth two (2) times daily as needed. , Disp: , Rfl:     peg 400-propylene glycol (SYSTANE) 0.4-0.3 % drop, Administer  to left eye as needed. , Disp: , Rfl:     multivitamin (ONE A DAY) tablet, Take 1 Tab by mouth daily. , Disp: , Rfl:     aspirin 81 mg tablet, Take 81 mg by mouth daily. , Disp: , Rfl:         Allergies   Allergen Reactions    Pcn [Penicillins] Hives    Tape [Adhesive] Other (comments)     Tears skin, redness      MRI Results (most recent):  Results from Hospital Encounter encounter on 03/29/23    MRI BRAIN WO CONT    Narrative  CLINICAL HISTORY: Possible CVA. INDICATION: Possible CVA. COMPARISON: NONE    TECHNIQUE: MR examination of the brain includes axial and sagittal T1, coronal  T2, axial T2, axial FLAIR, axial gradient echo, axial DWI. CONTRAST: None    FINDINGS:  There is no intracranial mass, hemorrhage or evidence of acute infarction.   Periventricular and scattered foci of increased T2 signal intensity at the  radiata and centrum semiovale. There is sulcal and ventricular prominence The brain architecture is normal.  There is no evidence of midline shift or mass-effect. There are no extra-axial  fluid collections. There is no Chiari or syrinx. The pituitary and infundibulum  are grossly unremarkable. There is no skull base mass. Cerebellopontine angles  are grossly unremarkable. The major intracranial vascular flow-voids are  unremarkable. The cavernous sinuses are symmetric. Optic chiasm and infundibulum  grossly unremarkable. Orbits are grossly symmetric. Dural venous sinuses are grossly patent. The mastoid air cells are well pneumatized and clear. Impression  Mild to moderate chronic microvascular ischemic change and mild temporal  predominant cerebral atrophy. There is no intracranial mass, hemorrhage or evidence of acute infarction. No acute intracranial process is demonstrated. Results from Hospital Encounter encounter on 03/29/23    MRI BRAIN WO CONT    Narrative  CLINICAL HISTORY: Possible CVA. INDICATION: Possible CVA. COMPARISON: NONE    TECHNIQUE: MR examination of the brain includes axial and sagittal T1, coronal  T2, axial T2, axial FLAIR, axial gradient echo, axial DWI. CONTRAST: None    FINDINGS:  There is no intracranial mass, hemorrhage or evidence of acute infarction. Periventricular and scattered foci of increased T2 signal intensity at the  radiata and centrum semiovale. There is sulcal and ventricular prominence The brain architecture is normal.  There is no evidence of midline shift or mass-effect. There are no extra-axial  fluid collections. There is no Chiari or syrinx. The pituitary and infundibulum  are grossly unremarkable. There is no skull base mass. Cerebellopontine angles  are grossly unremarkable. The major intracranial vascular flow-voids are  unremarkable. The cavernous sinuses are symmetric.  Optic chiasm and infundibulum  grossly unremarkable. Orbits are grossly symmetric. Dural venous sinuses are grossly patent. The mastoid air cells are well pneumatized and clear. Impression  Mild to moderate chronic microvascular ischemic change and mild temporal  predominant cerebral atrophy. There is no intracranial mass, hemorrhage or evidence of acute infarction. No acute intracranial process is demonstrated. Review of Systems:  A comprehensive review of systems was negative except for: Constitutional: positive for fatigue and malaise  Neurological: positive for memory problems and episodes of confusion  Behvioral/Psych: positive for possible dementia    Vitals:    03/30/23 0245 03/30/23 0738 03/30/23 0800 03/30/23 1200   BP: 118/65 135/74     Pulse: 72 70 78 85   Resp: 15 18     Temp: 98.2 °F (36.8 °C) 97.5 °F (36.4 °C)     SpO2: 93% 97%     Weight:         Objective:     I      NEUROLOGICAL EXAM:    Appearance: The patient is well developed, well nourished, provides a fair history and is in no acute distress. Mental Status: Oriented to place and person, and not the date or the president, patient can remember only 1 of 3 words at 30 seconds, cannot do serial sevens, could spell world backward, and with some difficulty got clock drawing cognitive function is abnormal and speech is fluent and no aphasia or dysarthria. Mood and affect appropriate but mildly confused. Cranial Nerves:   Intact visual fields. Fundi are benign, disc are flat, no lesions seen on funduscopy. ANA, EOM's full, no nystagmus, no ptosis. Facial sensation is normal. Corneal reflexes are not tested. Facial movement is symmetric. Hearing is normal bilaterally. Palate is midline with normal sternocleidomastoid and trapezius muscles are normal. Tongue is midline.   Neck without meningismus or bruits  Temporal arteries are not tender or enlarged  TMJ areas are not tender on palpation   Motor:  5/5 strength in upper and lower proximal and distal muscles. Normal bulk and tone. No fasciculations. Rapid alternating movement is symmetric and intact bilaterally   Reflexes:   Deep tendon reflexes 2+/4 and symmetrical.  No babinski or clonus present   Sensory:   Normal to touch, pinprick and vibration and temperature. DSS is intact   Gait:  Not tested because patient on stretcher and Doppler lab   Tremor:   No tremor noted. Cerebellar:  Not tested abnormal cerebellar signs present on Romberg and tandem testing and finger-nose-finger exam.   Neurovascular:  Normal heart sounds and regular rhythm, peripheral pulses decreased, and no carotid bruits. Assessment:     Active Problems:    Amnesia (3/29/2023)      Disturbance of memory (3/29/2023)      Acute alteration in mental status (3/29/2023)      Late onset Alzheimer's disease without behavioral disturbance ([address] (3/29/2023)      Bilateral carotid artery stenosis (3/29/2023)      Transient ischemic attack involving left internal carotid artery (3/29/2023)      Plan:     Patient most likely has a senile dementia of the Alzheimer's type, more amnestic, and will need neuropsych testing to evaluate this dementia to rule out frontotemporal dementia, and she will probably be a candidate for cognitive enhancing agents, and she needs a neuropsych evaluation to check her level of competency and financial affairs etc. to guide the family on power of 's. Patient was advised not to drive due to her dementia and the [place] state law stating that if there is memory impairment, they need to pass the [facility] 's evaluation to prove they can drive safely. We encouraged the patient stay physically at mentally active, not to eat a Mediterranean type diet, and exercise about half an hour a day of mild exercise. Her Dopplers were reviewed and her EEG was reviewed the EEG was normal and the Doppler was okay also. Her K41 levels and folic acid levels and vitamin D levels and TSH levels were all okay.   Her MRI scan just showed atrophy consistent with her dementia  We went over the driving restrictions in detail with her again, and told her that she cannot drive until she can prove she passes a 's test it was a 's evaluation course at [facility] or a private  in school, work [employer]. We will get her back to the office to do formal neuropsych testing, we will start her on Aricept 5 mg a day, and we explained the risk and benefits of the medication to her in detail. Discussed with hospitalist and nursing staff. She is encouraged take vitamin D every day and multivitamin every day. 38 minutes spent doing all of the review of records, reviewing her MRI scan, reviewing her CAT scan and CTA of the head and neck today, reviewing her Dopplers, and discussing her diagnosis prognosis and treatment options with her in detail today.     Signed By: Keyon Rueda MD     March 30, 2023       CC: Laura Martinez, [address]: [phone]

## 2023-04-04 ENCOUNTER — TELEPHONE (OUTPATIENT)
Dept: NEUROLOGY | Age: 78
End: 2023-04-04

## 2023-04-04 NOTE — TELEPHONE ENCOUNTER
Pt states Dr. Vidya Brown wrote on her discharge wrote on her [facility] dc paperwork that she needs to be seen within a week. Pt talked over me the whole conversation and did not want to listen to what I was saying. Explained next avail appt was in June and that we will need to get approval from Dr. Vidya Brown to have her seen before that. Pt raised her voice stating she needs to be seen in a week. States she needs neuropsych testing. Tried to explain that we need an appt to get a referral set up for testing a there is no referral in file now. Even after explaining that it is a 3-4 hour test she still thinks it will be handled next week. Pt was seen in hosp for dementia.  Please call [phone]

## 2023-04-05 NOTE — TELEPHONE ENCOUNTER
Spoke with son, Prabha Abbott per pt request.  Verified patient with two patient identifiers. Advised we offered 2 days however, pt states she has other appts on those days. Scheduled appt for Thurs 4-, arrive at 11:45am.  [facility] [id number]. Done    Prabha Abbott verbalized understanding.

## 2023-04-20 ENCOUNTER — OFFICE VISIT (OUTPATIENT)
Dept: NEUROLOGY | Age: 78
End: 2023-04-20
Payer: MEDICARE

## 2023-04-20 VITALS
SYSTOLIC BLOOD PRESSURE: 132 MMHG | OXYGEN SATURATION: 98 % | DIASTOLIC BLOOD PRESSURE: 80 MMHG | RESPIRATION RATE: 18 BRPM | BODY MASS INDEX: 25.13 KG/M2 | HEIGHT: 64 IN | WEIGHT: 147.2 LBS | HEART RATE: 71 BPM | TEMPERATURE: 98.1 F

## 2023-04-20 DIAGNOSIS — R41.82 ACUTE ALTERATION IN MENTAL STATUS: ICD-10-CM

## 2023-04-20 DIAGNOSIS — F02.80 LATE ONSET ALZHEIMER'S DISEASE WITHOUT BEHAVIORAL DISTURBANCE (HCC): ICD-10-CM

## 2023-04-20 DIAGNOSIS — G30.1 LATE ONSET ALZHEIMER'S DISEASE WITHOUT BEHAVIORAL DISTURBANCE (HCC): ICD-10-CM

## 2023-04-20 DIAGNOSIS — I65.23 BILATERAL CAROTID ARTERY STENOSIS: Primary | ICD-10-CM

## 2023-04-20 DIAGNOSIS — Z86.73 HISTORY OF TIA (TRANSIENT ISCHEMIC ATTACK) AND STROKE: ICD-10-CM

## 2023-04-20 DIAGNOSIS — R41.3 DISTURBANCE OF MEMORY: ICD-10-CM

## 2023-04-20 PROCEDURE — 1111F DSCHRG MED/CURRENT MED MERGE: CPT | Performed by: PSYCHIATRY & NEUROLOGY

## 2023-04-20 PROCEDURE — 1090F PRES/ABSN URINE INCON ASSESS: CPT | Performed by: PSYCHIATRY & NEUROLOGY

## 2023-04-20 PROCEDURE — G8536 NO DOC ELDER MAL SCRN: HCPCS | Performed by: PSYCHIATRY & NEUROLOGY

## 2023-04-20 PROCEDURE — 1101F PT FALLS ASSESS-DOCD LE1/YR: CPT | Performed by: PSYCHIATRY & NEUROLOGY

## 2023-04-20 PROCEDURE — 1123F ACP DISCUSS/DSCN MKR DOCD: CPT | Performed by: PSYCHIATRY & NEUROLOGY

## 2023-04-20 PROCEDURE — G8427 DOCREV CUR MEDS BY ELIG CLIN: HCPCS | Performed by: PSYCHIATRY & NEUROLOGY

## 2023-04-20 PROCEDURE — 3017F COLORECTAL CA SCREEN DOC REV: CPT | Performed by: PSYCHIATRY & NEUROLOGY

## 2023-04-20 PROCEDURE — G8417 CALC BMI ABV UP PARAM F/U: HCPCS | Performed by: PSYCHIATRY & NEUROLOGY

## 2023-04-20 PROCEDURE — 99214 OFFICE O/P EST MOD 30 MIN: CPT | Performed by: PSYCHIATRY & NEUROLOGY

## 2023-04-20 PROCEDURE — G8510 SCR DEP NEG, NO PLAN REQD: HCPCS | Performed by: PSYCHIATRY & NEUROLOGY

## 2023-04-20 PROCEDURE — G8400 PT W/DXA NO RESULTS DOC: HCPCS | Performed by: PSYCHIATRY & NEUROLOGY

## 2023-04-20 RX ORDER — DONEPEZIL HYDROCHLORIDE 5 MG/1
5 TABLET, FILM COATED ORAL
Qty: 90 TABLET | Refills: 4 | Status: SHIPPED | OUTPATIENT
Start: 2023-04-20

## 2023-04-20 RX ORDER — DONEPEZIL HYDROCHLORIDE 5 MG/1
5 TABLET, FILM COATED ORAL
Qty: 90 TABLET | Refills: 4 | Status: SHIPPED | OUTPATIENT
Start: 2023-04-20 | End: 2023-04-20 | Stop reason: ALTCHOICE

## 2023-04-20 NOTE — PROGRESS NOTES
Chief Complaint   Patient presents with    [address] 34 from 3/29-3/30/23 for amnesia - hx of TIA      1. Have you been to the ER, urgent care clinic since your last visit? Hospitalized since your last visit? Yes see above     2. Have you seen or consulted any other health care providers outside of the [address] [place] [address] since your last visit? Include any pap smears or colon screening.   No

## 2023-04-20 NOTE — PROGRESS NOTES
Consult  REFERRED BY:  Yola Benson NP    CHIEF COMPLAINT: Memory loss and confusional episodes      Subjective:     Deborah Arellano is a 78 y.o. right-handed  female we are asked to evaluate at the request of nurse practitioner Salo Piedra of new problem of increasing spells of confusion and disorientation and some chronic progressive memory loss over the last year or 2, with the patient having Pres when she gets lost driving, and was just hospitalized for one of the spells in March 2023. 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 She was last seen about a year ago in neurology clinic and had some mild cognitive impairment but no clear dementia, but now she seems to be clearly progressing. She had a CTA of the head and neck and a CT of the head that were normal on admission. MRI scan is ordered. B12 thyroid and vitamin D levels are all ordered also. Her EEG was normal this morning and her carotid Doppler was unremarkable. 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 He apparently gives no family history of dementia. She has had no recent head trauma, no complaints of headache, no fever, no meningismus, no toxin exposure, and no other cause for her memory loss and denies any unusual stress or depression.   Patient also has recent right sided headache, but had a sed rate of 27 in the hospital and did not have any temporal artery tenderness or enlargement, I suggested might be TMJ problems and we will follow and she can take over-the-counter medication for now but we proceed with her memory test.    Past Medical History:   Diagnosis Date    Arthritis     Cancer ([facility] [address]     vulva    Cerebral artery occlusion with cerebral infarction [facility])     Hypertension     Shoulder fracture, left       Past Surgical History:   Procedure Laterality Date    HX CHOLECYSTECTOMY      HX GYN       History reviewed. No pertinent family history. Social History     Tobacco Use    Smoking status: Never    Smokeless tobacco: Never   Substance Use Topics    Alcohol use: No         Current Outpatient Medications:     donepeziL (ARICEPT) 5 mg tablet, Take 1 Tablet by mouth nightly., Disp: 90 Tablet, Rfl: 4    cyanocobalamin 1,000 mcg tablet, Take 1 Tablet by mouth daily for 30 days. , Disp: 30 Tablet, Rfl: 0    omeprazole (PRILOSEC) 40 mg capsule, Take 1 Capsule by mouth daily. , Disp: , Rfl:     rosuvastatin (CRESTOR) 10 mg tablet, take 1 tablet by mouth once daily, Disp: , Rfl: 0    ascorbic acid, vitamin C, (VITAMIN C) 500 mg tablet, Take  by mouth daily. , Disp: , Rfl:     cholecalciferol (VITAMIN D3) 400 unit tab tablet, Take  by mouth daily. , Disp: , Rfl:     docusate sodium (COLACE) 100 mg capsule, Take 1 Capsule by mouth two (2) times daily as needed. , Disp: , Rfl:     peg 400-propylene glycol (SYSTANE) 0.4-0.3 % drop, Administer  to left eye as needed. , Disp: , Rfl:     multivitamin (ONE A DAY) tablet, Take 1 Tablet by mouth daily. , Disp: , Rfl:     meclizine (ANTIVERT) 25 mg tablet, Take 1 Tab by mouth three (3) times daily as needed for Dizziness. , Disp: 20 Tab, Rfl: 0    aspirin 81 mg tablet, Take 1 Tablet by mouth daily. , Disp: , Rfl:         Allergies   Allergen Reactions    Pcn [Penicillins] Hives    Tape [Adhesive] Other (comments)     Tears skin, redness      MRI Results (most recent):  Results from Hospital Encounter encounter on 03/29/23    MRI BRAIN WO CONT    Narrative  CLINICAL HISTORY: Possible CVA. INDICATION: Possible CVA. COMPARISON: NONE    TECHNIQUE: MR examination of the brain includes axial and sagittal T1, coronal  T2, axial T2, axial FLAIR, axial gradient echo, axial DWI. CONTRAST: None    FINDINGS:  There is no intracranial mass, hemorrhage or evidence of acute infarction. Periventricular and scattered foci of increased T2 signal intensity at the  radiata and centrum semiovale. There is sulcal and ventricular prominence The brain architecture is normal.  There is no evidence of midline shift or mass-effect. There are no extra-axial  fluid collections. There is no Chiari or syrinx. The pituitary and infundibulum  are grossly unremarkable. There is no skull base mass. Cerebellopontine angles  are grossly unremarkable. The major intracranial vascular flow-voids are  unremarkable. The cavernous sinuses are symmetric. Optic chiasm and infundibulum  grossly unremarkable. Orbits are grossly symmetric. Dural venous sinuses are grossly patent. The mastoid air cells are well pneumatized and clear. Impression  Mild to moderate chronic microvascular ischemic change and mild temporal  predominant cerebral atrophy. There is no intracranial mass, hemorrhage or evidence of acute infarction. No acute intracranial process is demonstrated. Results from Hospital Encounter encounter on 03/29/23    MRI BRAIN WO CONT    Narrative  CLINICAL HISTORY: Possible CVA. INDICATION: Possible CVA. COMPARISON: NONE    TECHNIQUE: MR examination of the brain includes axial and sagittal T1, coronal  T2, axial T2, axial FLAIR, axial gradient echo, axial DWI. CONTRAST: None    FINDINGS:  There is no intracranial mass, hemorrhage or evidence of acute infarction. Periventricular and scattered foci of increased T2 signal intensity at the  radiata and centrum semiovale.     There is sulcal and ventricular prominence The brain architecture is normal.  There is no evidence of midline shift or mass-effect. There are no extra-axial  fluid collections. There is no Chiari or syrinx. The pituitary and infundibulum  are grossly unremarkable. There is no skull base mass. Cerebellopontine angles  are grossly unremarkable. The major intracranial vascular flow-voids are  unremarkable. The cavernous sinuses are symmetric. Optic chiasm and infundibulum  grossly unremarkable. Orbits are grossly symmetric. Dural venous sinuses are grossly patent. The mastoid air cells are well pneumatized and clear. Impression  Mild to moderate chronic microvascular ischemic change and mild temporal  predominant cerebral atrophy. There is no intracranial mass, hemorrhage or evidence of acute infarction. No acute intracranial process is demonstrated. Review of Systems:  A comprehensive review of systems was negative except for: Constitutional: positive for fatigue and malaise  Neurological: positive for memory problems and episodes of confusion  Behvioral/Psych: positive for possible dementia    Vitals:    04/20/23 1221   BP: 132/80   Pulse: 71   Resp: 18   Temp: 98.1 °F (36.7 °C)   TempSrc: Temporal   SpO2: 98%   Weight: 147 lb 3.2 oz (66.8 kg)   Height: 5' 4\" (1.626 m)     Objective:     I      NEUROLOGICAL EXAM:    Appearance: The patient is well developed, well nourished, provides a fair history and is in no acute distress. Mental Status: Oriented to place and person, and not the date or the president, patient can remember only 1 of 3 words at 30 seconds, cannot do serial sevens, could spell world backward, and with some difficulty got clock drawing cognitive function is abnormal and speech is fluent and no aphasia or dysarthria. Mood and affect appropriate but mildly confused. Cranial Nerves:   Intact visual fields. Fundi are benign, disc are flat, no lesions seen on funduscopy. ANA, EOM's full, no nystagmus, no ptosis.  Facial sensation is normal. Corneal reflexes are not tested. Facial movement is symmetric. Hearing is normal bilaterally. Palate is midline with normal sternocleidomastoid and trapezius muscles are normal. Tongue is midline. Neck without meningismus or bruits  Temporal arteries are not tender or enlarged  TMJ areas are not tender on palpation   Motor:  5/5 strength in upper and lower proximal and distal muscles. Normal bulk and tone. No fasciculations. Rapid alternating movement is symmetric and intact bilaterally   Reflexes:   Deep tendon reflexes 2+/4 and symmetrical.  No babinski or clonus present   Sensory:   Normal to touch, pinprick and vibration and temperature. DSS is intact   Gait:  Not tested because patient on stretcher and Doppler lab   Tremor:   No tremor noted. Cerebellar:  Not tested abnormal cerebellar signs present on Romberg and tandem testing and finger-nose-finger exam.   Neurovascular:  Normal heart sounds and regular rhythm, peripheral pulses decreased, and no carotid bruits. Assessment:     Active Problems:    * No active hospital problems. *      Plan:     Patient most likely has a senile dementia of the Alzheimer's type, more amnestic, and will need neuropsych testing to evaluate this dementia to rule out frontotemporal dementia, and she will probably be a candidate for cognitive enhancing agents, and she needs a neuropsych evaluation to check her level of competency and financial affairs etc. to guide the family on power of 's. We advised the patient not to drive, where that very strongly to her, because of her memory problems. We will also order neuropsych testing, and her B12 level and thyroid test were normal.  Patient was advised not to drive due to her dementia and the [place] state law stating that if there is memory impairment, they need to pass the [employer] 's evaluation to prove they can drive safely.   We encouraged the patient stay physically at mentally active, not to eat a Mediterranean type diet, and exercise about half an hour a day of mild exercise. Her Dopplers were reviewed and her EEG was reviewed the EEG was normal and the Doppler was okay also. Her E74 levels and folic acid levels and vitamin D levels and TSH levels were all okay. Her MRI scan just showed atrophy consistent with her dementia  We went over the driving restrictions in detail with her again, and told her that she cannot drive until she can prove she passes a 's test it was a 's evaluation course at [facility] or a private  in school, work [employer]. We will get her back to the office to do formal neuropsych testing, we will start her on Aricept 5 mg a day, and we explained the risk and benefits of the medication to her in detail. She is encouraged take vitamin D every day and multivitamin every day. She is also encouraged to stay physically mentally active, and eat a Mediterranean type diet, as a way of also protecting her memory. She has a new problem of right temporal headache, but I do not see anything wrong there and does not have any temporal artery tenderness or enlargement and her sed rate in the hospital was 27, we will watch that and advised her to make sure she does not clench her teeth or have bruxism. 38 minutes spent doing all of the review of records, reviewing her MRI scan, reviewing her CAT scan and CTA of the head and neck today, reviewing her Dopplers, and discussing her diagnosis prognosis and treatment options with her in detail today. Also encouraged the patient to consider talking to her children about getting power of  and supervision for her financial affairs. .  Follow-up and 3 to 4 months time with a nurse practitioner in 6 to 9 months with me. Signed By: Johny Sanchez MD     April 20, 2023       CC:  Rona Cummings, [address] [facility]: [phone]

## 2023-05-01 ENCOUNTER — TELEPHONE (OUTPATIENT)
Dept: NEUROLOGY | Age: 78
End: 2023-05-01

## 2023-05-01 NOTE — TELEPHONE ENCOUNTER
Patient called wanting to know if she can go back on her allergy medication ([facility] allergy relief 4 mg tablet) as she is having really bad post nasal drip. Pt requested call back asap. Please advise.  [phone]

## 2023-06-20 ENCOUNTER — TELEPHONE (OUTPATIENT)
Age: 78
End: 2023-06-20

## 2023-06-20 DIAGNOSIS — F02.A0 MILD EARLY ONSET ALZHEIMER'S DEMENTIA WITHOUT BEHAVIORAL DISTURBANCE, PSYCHOTIC DISTURBANCE, MOOD DISTURBANCE, OR ANXIETY (HCC): Primary | ICD-10-CM

## 2023-06-20 DIAGNOSIS — G30.0 MILD EARLY ONSET ALZHEIMER'S DEMENTIA WITHOUT BEHAVIORAL DISTURBANCE, PSYCHOTIC DISTURBANCE, MOOD DISTURBANCE, OR ANXIETY (HCC): Primary | ICD-10-CM

## 2023-06-20 NOTE — TELEPHONE ENCOUNTER
Verified patient with 2 identifiers   Spoke with patient then her son ( per patient)  Advised that Dr Padilla Ovalles reordered the drivers simulation under OT. Advised I refaxed referral and received confirmation that faxed went through successfully. Advised [facility] should be calling to schedule however if they do not hear from them that they can call them at [phone].    Understanding verified

## 2023-06-20 NOTE — TELEPHONE ENCOUNTER
Patient called stating that she is unable to schedule the driving eval, she was told \"this is therapy\" and \"call back\". Pt is unsure what is going on and requested we please call the physical therapy office ([phone]). Please advise.

## 2023-07-24 ENCOUNTER — TELEPHONE (OUTPATIENT)
Age: 78
End: 2023-07-24

## 2023-07-24 NOTE — TELEPHONE ENCOUNTER
Pt's son, Lisbeth Hayes, asked that we please call him directly if there are any cancellations on Dr Valarie Lanes schedule.  [phone]

## 2023-10-31 ENCOUNTER — OFFICE VISIT (OUTPATIENT)
Age: 78
End: 2023-10-31
Payer: MEDICARE

## 2023-10-31 DIAGNOSIS — G30.1 LATE ONSET ALZHEIMER'S DISEASE WITHOUT BEHAVIORAL DISTURBANCE (HCC): Primary | ICD-10-CM

## 2023-10-31 DIAGNOSIS — F02.80 LATE ONSET ALZHEIMER'S DISEASE WITHOUT BEHAVIORAL DISTURBANCE (HCC): Primary | ICD-10-CM

## 2023-10-31 PROCEDURE — 90785 PSYTX COMPLEX INTERACTIVE: CPT | Performed by: CLINICAL NEUROPSYCHOLOGIST

## 2023-10-31 PROCEDURE — 1123F ACP DISCUSS/DSCN MKR DOCD: CPT | Performed by: CLINICAL NEUROPSYCHOLOGIST

## 2023-10-31 PROCEDURE — 4004F PT TOBACCO SCREEN RCVD TLK: CPT | Performed by: CLINICAL NEUROPSYCHOLOGIST

## 2023-10-31 PROCEDURE — 90791 PSYCH DIAGNOSTIC EVALUATION: CPT | Performed by: CLINICAL NEUROPSYCHOLOGIST

## 2023-10-31 NOTE — PROGRESS NOTES
Intake Note      Patient Name: Arnie Morales  YOB: 1945    Age: 70 y.o. Date of Intake: 10/31/2023   Education: 9 Ethnicity White    Gender: Female Referring Provider: Dr. Mague Tovar:  Arnie Morales  was referred for evaluation by her neurologist to assist in differential diagnosis and individualized treatment planning. she understood the rationale and procedures for evaluation, as well as the limits to confidentiality, and agreed to participate. she consented to have this report made available to her  treating providers through her  electronic medical records. History Sources: Patient, Relative (Son), and Medical Record    HISTORY OF PRESENT ILLNESS:  The patient is a 72-year-old female with pertinent medical history noted for amnesia, disturbance of memory, acute alteration in mental status, late onset Alzheimer's disease, bilateral carotid artery stenosis, and TIA. The patient presented for clinical interview accompanied by her son who assisted with establishing history. According to the patient's son, the patient experienced insidious onset of gradually progressive declines in her cognitive functioning, beginning approximately 2 to 3 years ago. These changes were described to include short-term episodic memory impairment, such as forgetting recent conversations with minimal benefit from cueing. They generally denied the presence of cognitive changes across other domains. Pertaining to the patient's emotional functioning, they indicated she has been more irritable and easily agitated. The patient reported a history of depression and anxiety in the past for which she was previously prescribed psychotropic medication. She has never participated in psychotherapy. She denied history of auditory or visual hallucinations and she denied history of passive or active suicidal ideation, intent, or plan.     With regard to the patient's daily functioning,

## 2023-11-20 ENCOUNTER — OFFICE VISIT (OUTPATIENT)
Age: 78
End: 2023-11-20
Payer: MEDICARE

## 2023-11-20 ENCOUNTER — PROCEDURE VISIT (OUTPATIENT)
Age: 78
End: 2023-11-20
Payer: MEDICARE

## 2023-11-20 VITALS
DIASTOLIC BLOOD PRESSURE: 78 MMHG | OXYGEN SATURATION: 99 % | RESPIRATION RATE: 16 BRPM | WEIGHT: 143.8 LBS | HEIGHT: 64 IN | TEMPERATURE: 97.7 F | HEART RATE: 77 BPM | BODY MASS INDEX: 24.55 KG/M2 | SYSTOLIC BLOOD PRESSURE: 122 MMHG

## 2023-11-20 DIAGNOSIS — I67.9 CEREBROVASCULAR DISEASE: ICD-10-CM

## 2023-11-20 DIAGNOSIS — G30.1 LATE ONSET ALZHEIMER'S DISEASE WITHOUT BEHAVIORAL DISTURBANCE (HCC): Primary | ICD-10-CM

## 2023-11-20 DIAGNOSIS — I63.9 CEREBRAL INFARCTION, UNSPECIFIED MECHANISM (HCC): Primary | ICD-10-CM

## 2023-11-20 DIAGNOSIS — F02.80 LATE ONSET ALZHEIMER'S DISEASE WITHOUT BEHAVIORAL DISTURBANCE (HCC): Primary | ICD-10-CM

## 2023-11-20 DIAGNOSIS — F02.80 DEMENTIA IN OTHER DISEASES CLASSIFIED ELSEWHERE, UNSPECIFIED SEVERITY, WITHOUT BEHAVIORAL DISTURBANCE, PSYCHOTIC DISTURBANCE, MOOD DISTURBANCE, AND ANXIETY (HCC): ICD-10-CM

## 2023-11-20 DIAGNOSIS — G47.9 SLEEP DISORDER: ICD-10-CM

## 2023-11-20 PROCEDURE — 96136 PSYCL/NRPSYC TST PHY/QHP 1ST: CPT | Performed by: CLINICAL NEUROPSYCHOLOGIST

## 2023-11-20 PROCEDURE — G8427 DOCREV CUR MEDS BY ELIG CLIN: HCPCS | Performed by: NURSE PRACTITIONER

## 2023-11-20 PROCEDURE — G8420 CALC BMI NORM PARAMETERS: HCPCS | Performed by: NURSE PRACTITIONER

## 2023-11-20 PROCEDURE — G8484 FLU IMMUNIZE NO ADMIN: HCPCS | Performed by: NURSE PRACTITIONER

## 2023-11-20 PROCEDURE — 1090F PRES/ABSN URINE INCON ASSESS: CPT | Performed by: NURSE PRACTITIONER

## 2023-11-20 PROCEDURE — 99213 OFFICE O/P EST LOW 20 MIN: CPT | Performed by: NURSE PRACTITIONER

## 2023-11-20 PROCEDURE — 1123F ACP DISCUSS/DSCN MKR DOCD: CPT | Performed by: NURSE PRACTITIONER

## 2023-11-20 PROCEDURE — G8400 PT W/DXA NO RESULTS DOC: HCPCS | Performed by: NURSE PRACTITIONER

## 2023-11-20 PROCEDURE — 1036F TOBACCO NON-USER: CPT | Performed by: NURSE PRACTITIONER

## 2023-11-20 PROCEDURE — 96138 PSYCL/NRPSYC TECH 1ST: CPT | Performed by: CLINICAL NEUROPSYCHOLOGIST

## 2023-11-20 PROCEDURE — 96139 PSYCL/NRPSYC TST TECH EA: CPT | Performed by: CLINICAL NEUROPSYCHOLOGIST

## 2023-11-20 RX ORDER — DONEPEZIL HYDROCHLORIDE 5 MG/1
5 TABLET, FILM COATED ORAL NIGHTLY
Qty: 30 TABLET | Refills: 4 | Status: SHIPPED | OUTPATIENT
Start: 2023-11-20 | End: 2023-11-20 | Stop reason: SDUPTHER

## 2023-11-20 RX ORDER — DONEPEZIL HYDROCHLORIDE 5 MG/1
5 TABLET, FILM COATED ORAL NIGHTLY
Qty: 30 TABLET | Refills: 4 | Status: SHIPPED | OUTPATIENT
Start: 2023-11-20

## 2023-11-20 ASSESSMENT — ENCOUNTER SYMPTOMS
EYES NEGATIVE: 1
TROUBLE SWALLOWING: 1

## 2023-11-20 ASSESSMENT — PATIENT HEALTH QUESTIONNAIRE - PHQ9
SUM OF ALL RESPONSES TO PHQ QUESTIONS 1-9: 1
2. FEELING DOWN, DEPRESSED OR HOPELESS: 1
SUM OF ALL RESPONSES TO PHQ QUESTIONS 1-9: 1
1. LITTLE INTEREST OR PLEASURE IN DOING THINGS: 0
SUM OF ALL RESPONSES TO PHQ9 QUESTIONS 1 & 2: 1

## 2023-11-20 NOTE — PROGRESS NOTES
this time. Patient is currently living with her son and she does limited driving. Patient did complete the driving assessment at [facility]. Healthy lifestyle encouraged, patient is to continue with the various cognitive activities which includes crossword puzzles, reading, etc.  Good sleep-wake cycle was encouraged. Patient has upcoming appointment primary care where she plans to have numerous labs drawn. 2.  Sleep disorder: Sleep hygiene encouraged, discussed possible trial of melatonin, she will defer at this time. Patient and/or family verbalized understand of all instructions and all questions/concerns were addressed. Safety/side effects of medications discussed. Patient remains a complex patient secondary to polypharmacy, significant comorbid conditions, and use of high-risk medications which complicate the decision making process related to patient's neurologic diagnosis. The patient is to follow up in 3-4 months, sooner if needed.     MASON Harden-BC

## 2023-12-04 ENCOUNTER — CLINICAL DOCUMENTATION (OUTPATIENT)
Age: 78
End: 2023-12-04

## 2023-12-04 NOTE — PROGRESS NOTES
Neuropsychological Evaluation Report      Patient Name: Sena Barth  YOB: 1945    Age: 70 y.o. Date of Intake: 10/31/2023   Education: 9 Date of Testin2023   Gender: Female Ethnicity: White     Referring Provider: Dr. Johnathan Wolfe:  Sena Barth  was referred for neuropsychological evaluation by her Neurologist to obtain a quantitative assessment of her current level of neurocognitive functioning, assist in differential diagnosis, and aid in individualized treatment planning. The patient understood the rationale and procedures for evaluation, as well as the limits to confidentiality, and they agreed to participate. The patient consented to have this report made available to her  treating providers through her  electronic medical records. This evaluation was completed with the patient by Ric Wahl PsyD with the exception of testing by technician, which was completed by NOAH Chin under the supervision of Dr. Narda Anthony. History Sources: Patient, Relative (son), Medical Record, and Test Data    SUMMARY AND IMPRESSION:  The following section is a summary of the patient's pertinent test results and impressions. A more thorough review of the patient's background and test scores can be found below. Relative to the patient's estimated baseline (low average), cognitive testing revealed variable inefficiencies (~1 standard deviation of decline) as on measures of learning and free recall. Recognition/discrimination was also variable but was generally commensurate with premorbid functioning. Aspects of executive functioning (i.e., set switching and inhibition/switching) were notable for mild to moderate declines (~1.5-2 standard deviations of decline). After accounting for executive dysfunction, attention/working memory was consistent with baseline estimates.   Mental processing speed, expressive language, and visuospatial functioning were also

## 2023-12-04 NOTE — PROGRESS NOTES
Patient's neuropsych testing did suggest a mild dementia, but most typical of subcortical dementia or vascular disease and not so suggestive of Alzheimer's type amnestic dementia.

## 2023-12-05 ENCOUNTER — OFFICE VISIT (OUTPATIENT)
Age: 78
End: 2023-12-05
Payer: MEDICARE

## 2023-12-05 DIAGNOSIS — F02.80 DEMENTIA IN OTHER DISEASES CLASSIFIED ELSEWHERE, UNSPECIFIED SEVERITY, WITHOUT BEHAVIORAL DISTURBANCE, PSYCHOTIC DISTURBANCE, MOOD DISTURBANCE, AND ANXIETY (HCC): ICD-10-CM

## 2023-12-05 DIAGNOSIS — I67.9 CEREBROVASCULAR DISEASE: ICD-10-CM

## 2023-12-05 DIAGNOSIS — I63.9 CEREBRAL INFARCTION, UNSPECIFIED MECHANISM (HCC): Primary | ICD-10-CM

## 2023-12-05 PROCEDURE — 96133 NRPSYC TST EVAL PHYS/QHP EA: CPT | Performed by: CLINICAL NEUROPSYCHOLOGIST

## 2023-12-05 PROCEDURE — 96132 NRPSYC TST EVAL PHYS/QHP 1ST: CPT | Performed by: CLINICAL NEUROPSYCHOLOGIST

## 2023-12-05 NOTE — PROGRESS NOTES
Prior to seeing the patient I reviewed pertinent records, including the previously completed report, the records in [address] [place] [address], and any updated visits from other providers since the patient's last visit. Today, I engaged in a psychoeducational and supportive feedback session with the patient. I provided psychotherapy in the form of psychoeducation and support with respect to the results of the recent Neuropsychological Evaluation, including discussing individual tests as well as patient's areas of neurocognitive strength versus weakness. We discussed, in detail, the following:  Reviewed findings from neuropsychological evaluation including test results, diagnosis, and suspected contributing factors  Discussed recommendations outlined in report  Answered questions to the best of my ability    Education was provided regarding my diagnostic impressions, and we discussed treatment plan/options. I also answered numerous questions related to the clinical findings, including discussing various methods to improve cognition and mood. Supportive/Cognitive Behavioral/Solution Focused psychotherapy provided. The patient needs to:    Follow-up with referring provider for ongoing management  Complete driving evaluation  Emphasize modifiable protective behaviors for cognitive functioning such as exercise, diet, and cognitive stimulation    The patient had the following concerns which I deferred to their referring provider:   meds for mood    TIME:  Clinical Interview: 1110 - 1130 = 20 minutes  Testing and scoring by provider: 16 minutes  Testing by technician: 8223 - 0012 = 150 minutes  Scoring by technician: 47 minutes  Neuropsychological testing evaluation services*: 150 minutes + 20 minutes feedback = 170 minutes total    BILLING:  51343 x 1 Unit  96136 x 1 Unit  96138 x 1 Unit  96139 x 6 Units  96132 x 1 Unit  96133 x 2 Units    *Neuropsychological testing evaluation services include: Integration of patient data,

## 2024-02-22 ENCOUNTER — OFFICE VISIT (OUTPATIENT)
Age: 79
End: 2024-02-22
Payer: MEDICARE

## 2024-02-22 VITALS
HEART RATE: 95 BPM | SYSTOLIC BLOOD PRESSURE: 122 MMHG | HEIGHT: 64 IN | WEIGHT: 146.4 LBS | TEMPERATURE: 97.6 F | BODY MASS INDEX: 25 KG/M2 | DIASTOLIC BLOOD PRESSURE: 66 MMHG | RESPIRATION RATE: 17 BRPM | OXYGEN SATURATION: 97 %

## 2024-02-22 DIAGNOSIS — F02.80 LATE ONSET ALZHEIMER'S DISEASE WITHOUT BEHAVIORAL DISTURBANCE (HCC): ICD-10-CM

## 2024-02-22 DIAGNOSIS — G45.1 TRANSIENT ISCHEMIC ATTACK INVOLVING LEFT INTERNAL CAROTID ARTERY: ICD-10-CM

## 2024-02-22 DIAGNOSIS — R41.3 DISTURBANCE OF MEMORY: Primary | ICD-10-CM

## 2024-02-22 DIAGNOSIS — G30.1 LATE ONSET ALZHEIMER'S DISEASE WITHOUT BEHAVIORAL DISTURBANCE (HCC): ICD-10-CM

## 2024-02-22 DIAGNOSIS — Z86.73 HISTORY OF TIA (TRANSIENT ISCHEMIC ATTACK) AND STROKE: ICD-10-CM

## 2024-02-22 DIAGNOSIS — I65.23 BILATERAL CAROTID ARTERY STENOSIS: ICD-10-CM

## 2024-02-22 DIAGNOSIS — Z86.73 PERSONAL HISTORY OF TRANSIENT ISCHEMIC ATTACK: ICD-10-CM

## 2024-02-22 PROCEDURE — 99214 OFFICE O/P EST MOD 30 MIN: CPT | Performed by: PSYCHIATRY & NEUROLOGY

## 2024-02-22 PROCEDURE — G8484 FLU IMMUNIZE NO ADMIN: HCPCS | Performed by: PSYCHIATRY & NEUROLOGY

## 2024-02-22 PROCEDURE — 1123F ACP DISCUSS/DSCN MKR DOCD: CPT | Performed by: PSYCHIATRY & NEUROLOGY

## 2024-02-22 PROCEDURE — G8427 DOCREV CUR MEDS BY ELIG CLIN: HCPCS | Performed by: PSYCHIATRY & NEUROLOGY

## 2024-02-22 PROCEDURE — 1036F TOBACCO NON-USER: CPT | Performed by: PSYCHIATRY & NEUROLOGY

## 2024-02-22 PROCEDURE — 1090F PRES/ABSN URINE INCON ASSESS: CPT | Performed by: PSYCHIATRY & NEUROLOGY

## 2024-02-22 PROCEDURE — G8400 PT W/DXA NO RESULTS DOC: HCPCS | Performed by: PSYCHIATRY & NEUROLOGY

## 2024-02-22 PROCEDURE — G8419 CALC BMI OUT NRM PARAM NOF/U: HCPCS | Performed by: PSYCHIATRY & NEUROLOGY

## 2024-02-22 RX ORDER — DONEPEZIL HYDROCHLORIDE 10 MG/1
10 TABLET, FILM COATED ORAL
Qty: 30 TABLET | Refills: 11 | Status: SHIPPED | OUTPATIENT
Start: 2024-02-22

## 2024-02-22 ASSESSMENT — PATIENT HEALTH QUESTIONNAIRE - PHQ9
SUM OF ALL RESPONSES TO PHQ QUESTIONS 1-9: 0
SUM OF ALL RESPONSES TO PHQ QUESTIONS 1-9: 0
2. FEELING DOWN, DEPRESSED OR HOPELESS: 0
SUM OF ALL RESPONSES TO PHQ QUESTIONS 1-9: 0
1. LITTLE INTEREST OR PLEASURE IN DOING THINGS: 0
SUM OF ALL RESPONSES TO PHQ QUESTIONS 1-9: 0
SUM OF ALL RESPONSES TO PHQ9 QUESTIONS 1 & 2: 0

## 2024-02-23 NOTE — PROGRESS NOTES
Consult  REFERRED BY:  Alvina Moore APRN - NP    CHIEF COMPLAINT: Progressive memory loss and need to go over her neuropsych testing      Subjective:     Susy Bajwa is a 78 y.o. right-handed  female we are asked to evaluate at the request of nurse practitioner Raul on urgent work in basis of new problem of patient just having her neuropsych testing done, 1 month ago, and that does show that she has a mild dementia, but more of a vascular type of dementia and atypical dementia possibly frontotemporal dementia, and not typical of Alzheimer's disease so she would not appear to be a candidate for the monoclonal antibodies against amyloid.  She is already on Aricept 5 mg a day and says she is having more difficulty with memory loss and confusion, so we will bump that up to 10 mg a day because she is tolerating it well and she will take it each day at suppertime.  We went over the monoclonal antibodies against amyloid and offered her a PET scan, but she wants no part of the medication because of the possible risk and would prefer to take the pills for now, and await other therapies in the near future.  Patient seen 1 year ago for increasing spells of confusion and disorientation and some chronic progressive memory loss over the last year or 2, with the patient having Pres when she gets lost driving, and was just hospitalized for one of the spells in March 2023..  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  She was last seen about a year ago in neurology clinic and had some mild cognitive impairment but no clear dementia, but now she seems to be clearly progressing.  She had a CTA of the head and neck and a CT of the head that were normal on admission.  MRI scan showed some atrophy and white matter disease, and

## 2024-03-14 ENCOUNTER — TELEPHONE (OUTPATIENT)
Age: 79
End: 2024-03-14

## 2024-03-14 NOTE — TELEPHONE ENCOUNTER
She states she is having red blisters on her feet, muscle cramps, weakness and fatigue. She believes its because they increased her donepezil to 10MG, but she wants to change it back to 5MG. She is also wanting to speak with a nurse about this on what she should do. Thank you.

## 2024-03-14 NOTE — TELEPHONE ENCOUNTER
Spoke with patient.  Verified patient with two patient identifiers.    Advised per Dr. Horowitz, Hold Donepezil.  Wait for symptoms to improve.  Then start back on Donepezil 5 mg qpm.  If symptoms return, stop it and call us.    Patient verbalized understanding.

## 2024-03-14 NOTE — TELEPHONE ENCOUNTER
Tried to reach pt however, VM has not been set up yet.  Unable to leave message.    Per Dr. Horowitz, stop Donepezil.  Wait until symptoms improve, then resume Donepezil at 5 mg qpm.    Will try again later.

## 2024-04-30 ENCOUNTER — TELEPHONE (OUTPATIENT)
Age: 79
End: 2024-04-30

## 2024-04-30 ENCOUNTER — CLINICAL DOCUMENTATION (OUTPATIENT)
Age: 79
End: 2024-04-30

## 2024-04-30 NOTE — TELEPHONE ENCOUNTER
Patient states she wants to get an xray of her right foot. She states she has no more blisters, but it's still cracked open and all her toes are red. Please give her a call back at . Thank you.

## 2024-05-20 ENCOUNTER — OFFICE VISIT (OUTPATIENT)
Age: 79
End: 2024-05-20
Payer: MEDICARE

## 2024-05-20 VITALS
DIASTOLIC BLOOD PRESSURE: 82 MMHG | HEIGHT: 64 IN | WEIGHT: 126.8 LBS | SYSTOLIC BLOOD PRESSURE: 128 MMHG | BODY MASS INDEX: 21.65 KG/M2 | HEART RATE: 72 BPM | TEMPERATURE: 97.8 F | RESPIRATION RATE: 18 BRPM | OXYGEN SATURATION: 97 %

## 2024-05-20 DIAGNOSIS — R41.3 DISTURBANCE OF MEMORY: ICD-10-CM

## 2024-05-20 DIAGNOSIS — I65.23 BILATERAL CAROTID ARTERY STENOSIS: ICD-10-CM

## 2024-05-20 DIAGNOSIS — Z86.73 HISTORY OF TIA (TRANSIENT ISCHEMIC ATTACK) AND STROKE: ICD-10-CM

## 2024-05-20 DIAGNOSIS — F02.80 LATE ONSET ALZHEIMER'S DISEASE WITHOUT BEHAVIORAL DISTURBANCE (HCC): Primary | ICD-10-CM

## 2024-05-20 DIAGNOSIS — Z86.73 PERSONAL HISTORY OF TRANSIENT ISCHEMIC ATTACK: ICD-10-CM

## 2024-05-20 DIAGNOSIS — G30.1 LATE ONSET ALZHEIMER'S DISEASE WITHOUT BEHAVIORAL DISTURBANCE (HCC): Primary | ICD-10-CM

## 2024-05-20 PROCEDURE — G8400 PT W/DXA NO RESULTS DOC: HCPCS | Performed by: PSYCHIATRY & NEUROLOGY

## 2024-05-20 PROCEDURE — G8420 CALC BMI NORM PARAMETERS: HCPCS | Performed by: PSYCHIATRY & NEUROLOGY

## 2024-05-20 PROCEDURE — 1036F TOBACCO NON-USER: CPT | Performed by: PSYCHIATRY & NEUROLOGY

## 2024-05-20 PROCEDURE — 1090F PRES/ABSN URINE INCON ASSESS: CPT | Performed by: PSYCHIATRY & NEUROLOGY

## 2024-05-20 PROCEDURE — 1123F ACP DISCUSS/DSCN MKR DOCD: CPT | Performed by: PSYCHIATRY & NEUROLOGY

## 2024-05-20 PROCEDURE — G8427 DOCREV CUR MEDS BY ELIG CLIN: HCPCS | Performed by: PSYCHIATRY & NEUROLOGY

## 2024-05-20 PROCEDURE — 99215 OFFICE O/P EST HI 40 MIN: CPT | Performed by: PSYCHIATRY & NEUROLOGY

## 2024-05-20 RX ORDER — PREDNISONE 50 MG/1
50 TABLET ORAL DAILY
COMMUNITY
Start: 2024-05-16

## 2024-05-20 RX ORDER — ESCITALOPRAM OXALATE 5 MG/1
5 TABLET ORAL
Qty: 30 TABLET | Refills: 11 | Status: SHIPPED | OUTPATIENT
Start: 2024-05-20

## 2024-05-20 RX ORDER — CHOLECALCIFEROL (VITAMIN D3) 125 MCG
5 CAPSULE ORAL
COMMUNITY
End: 2024-05-20 | Stop reason: SDUPTHER

## 2024-05-20 RX ORDER — IPRATROPIUM BROMIDE AND ALBUTEROL SULFATE 2.5; .5 MG/3ML; MG/3ML
SOLUTION RESPIRATORY (INHALATION)
COMMUNITY
Start: 2024-05-16

## 2024-05-20 RX ORDER — MEMANTINE HYDROCHLORIDE 5 MG/1
5 TABLET ORAL 2 TIMES DAILY
Qty: 60 TABLET | Refills: 11 | Status: SHIPPED | OUTPATIENT
Start: 2024-05-20

## 2024-05-20 RX ORDER — CHOLECALCIFEROL (VITAMIN D3) 125 MCG
5 CAPSULE ORAL
Qty: 30 TABLET | Refills: 11 | Status: SHIPPED | OUTPATIENT
Start: 2024-05-20

## 2024-05-20 RX ORDER — MEMANTINE HYDROCHLORIDE 5 MG/1
5 TABLET ORAL 2 TIMES DAILY
COMMUNITY
End: 2024-05-20 | Stop reason: SDUPTHER

## 2024-05-20 ASSESSMENT — PATIENT HEALTH QUESTIONNAIRE - PHQ9
7. TROUBLE CONCENTRATING ON THINGS, SUCH AS READING THE NEWSPAPER OR WATCHING TELEVISION: NEARLY EVERY DAY
9. THOUGHTS THAT YOU WOULD BE BETTER OFF DEAD, OR OF HURTING YOURSELF: SEVERAL DAYS
8. MOVING OR SPEAKING SO SLOWLY THAT OTHER PEOPLE COULD HAVE NOTICED. OR THE OPPOSITE, BEING SO FIGETY OR RESTLESS THAT YOU HAVE BEEN MOVING AROUND A LOT MORE THAN USUAL: NEARLY EVERY DAY
SUM OF ALL RESPONSES TO PHQ QUESTIONS 1-9: 25
SUM OF ALL RESPONSES TO PHQ QUESTIONS 1-9: 25
6. FEELING BAD ABOUT YOURSELF - OR THAT YOU ARE A FAILURE OR HAVE LET YOURSELF OR YOUR FAMILY DOWN: NEARLY EVERY DAY
SUM OF ALL RESPONSES TO PHQ QUESTIONS 1-9: 24
10. IF YOU CHECKED OFF ANY PROBLEMS, HOW DIFFICULT HAVE THESE PROBLEMS MADE IT FOR YOU TO DO YOUR WORK, TAKE CARE OF THINGS AT HOME, OR GET ALONG WITH OTHER PEOPLE: EXTREMELY DIFFICULT
SUM OF ALL RESPONSES TO PHQ QUESTIONS 1-9: 25
1. LITTLE INTEREST OR PLEASURE IN DOING THINGS: NEARLY EVERY DAY
2. FEELING DOWN, DEPRESSED OR HOPELESS: NEARLY EVERY DAY
SUM OF ALL RESPONSES TO PHQ9 QUESTIONS 1 & 2: 6
4. FEELING TIRED OR HAVING LITTLE ENERGY: NEARLY EVERY DAY
3. TROUBLE FALLING OR STAYING ASLEEP: NEARLY EVERY DAY
5. POOR APPETITE OR OVEREATING: NEARLY EVERY DAY

## 2024-05-20 ASSESSMENT — COLUMBIA-SUICIDE SEVERITY RATING SCALE - C-SSRS
2. HAVE YOU ACTUALLY HAD ANY THOUGHTS OF KILLING YOURSELF?: NO
6. HAVE YOU EVER DONE ANYTHING, STARTED TO DO ANYTHING, OR PREPARED TO DO ANYTHING TO END YOUR LIFE?: NO
1. WITHIN THE PAST MONTH, HAVE YOU WISHED YOU WERE DEAD OR WISHED YOU COULD GO TO SLEEP AND NOT WAKE UP?: YES

## 2024-05-21 NOTE — PROGRESS NOTES
signs present on Romberg and tandem testing and finger-nose-finger exam.   Neurovascular:  Normal heart sounds and regular rhythm, peripheral pulses decreased, and no carotid bruits.        Assessment:      Diagnosis Orders   1. Late onset Alzheimer's disease without behavioral disturbance (HCC)  memantine (NAMENDA) 5 MG tablet    escitalopram (LEXAPRO) 5 MG tablet    melatonin 5 MG TABS tablet      2. History of TIA (transient ischemic attack) and stroke  memantine (NAMENDA) 5 MG tablet    escitalopram (LEXAPRO) 5 MG tablet    melatonin 5 MG TABS tablet      3. Disturbance of memory  memantine (NAMENDA) 5 MG tablet    escitalopram (LEXAPRO) 5 MG tablet    melatonin 5 MG TABS tablet      4. Bilateral carotid artery stenosis  memantine (NAMENDA) 5 MG tablet    escitalopram (LEXAPRO) 5 MG tablet    melatonin 5 MG TABS tablet      5. Personal history of transient ischemic attack  memantine (NAMENDA) 5 MG tablet    escitalopram (LEXAPRO) 5 MG tablet    melatonin 5 MG TABS tablet        Active Problems:    * No active hospital problems. *  Resolved Problems:    * No resolved hospital problems. *      Plan:     Patient seen on urgent work in basis because of persistent nausea vomiting, not taking her medications right, still driving, and the son feeling that he cannot take care of her anymore, and she needs assisted living he does not know how to get started or what else he can do, she can take care of her own personal hygiene because of her GI issues and nobody knows whether she is still taking the Aricept even though she was told to stop it over 2 months ago.  He is to go back and make sure he removes that medication out of her medicine box and we will start her on Namenda to see if that will help with the memory and help with her GI issues in addition.  The risk and benefits that medication likely the son and the patient in great detail.  We gave the patient and his son a list of people they can contact to help with

## 2024-10-03 ENCOUNTER — OFFICE VISIT (OUTPATIENT)
Age: 79
End: 2024-10-03
Payer: MEDICARE

## 2024-10-03 VITALS
BODY MASS INDEX: 23.25 KG/M2 | HEIGHT: 64 IN | DIASTOLIC BLOOD PRESSURE: 64 MMHG | RESPIRATION RATE: 16 BRPM | SYSTOLIC BLOOD PRESSURE: 112 MMHG | OXYGEN SATURATION: 98 % | HEART RATE: 76 BPM | TEMPERATURE: 97.8 F | WEIGHT: 136.2 LBS

## 2024-10-03 DIAGNOSIS — F02.80 TEMPORAL ATROPHY VARIANT OF FRONTOTEMPORAL DEMENTIA (HCC): ICD-10-CM

## 2024-10-03 DIAGNOSIS — F02.80 LATE ONSET ALZHEIMER'S DISEASE WITHOUT BEHAVIORAL DISTURBANCE (HCC): Primary | ICD-10-CM

## 2024-10-03 DIAGNOSIS — G30.1 LATE ONSET ALZHEIMER'S DISEASE WITHOUT BEHAVIORAL DISTURBANCE (HCC): Primary | ICD-10-CM

## 2024-10-03 DIAGNOSIS — R41.3 DISTURBANCE OF MEMORY: ICD-10-CM

## 2024-10-03 DIAGNOSIS — I65.23 BILATERAL CAROTID ARTERY STENOSIS: ICD-10-CM

## 2024-10-03 DIAGNOSIS — G31.09 TEMPORAL ATROPHY VARIANT OF FRONTOTEMPORAL DEMENTIA (HCC): ICD-10-CM

## 2024-10-03 DIAGNOSIS — Z86.73 PERSONAL HISTORY OF TRANSIENT ISCHEMIC ATTACK: ICD-10-CM

## 2024-10-03 PROCEDURE — 1123F ACP DISCUSS/DSCN MKR DOCD: CPT | Performed by: PSYCHIATRY & NEUROLOGY

## 2024-10-03 PROCEDURE — G8484 FLU IMMUNIZE NO ADMIN: HCPCS | Performed by: PSYCHIATRY & NEUROLOGY

## 2024-10-03 PROCEDURE — G8427 DOCREV CUR MEDS BY ELIG CLIN: HCPCS | Performed by: PSYCHIATRY & NEUROLOGY

## 2024-10-03 PROCEDURE — 99214 OFFICE O/P EST MOD 30 MIN: CPT | Performed by: PSYCHIATRY & NEUROLOGY

## 2024-10-03 PROCEDURE — G8400 PT W/DXA NO RESULTS DOC: HCPCS | Performed by: PSYCHIATRY & NEUROLOGY

## 2024-10-03 PROCEDURE — 1036F TOBACCO NON-USER: CPT | Performed by: PSYCHIATRY & NEUROLOGY

## 2024-10-03 PROCEDURE — G8420 CALC BMI NORM PARAMETERS: HCPCS | Performed by: PSYCHIATRY & NEUROLOGY

## 2024-10-03 PROCEDURE — 1090F PRES/ABSN URINE INCON ASSESS: CPT | Performed by: PSYCHIATRY & NEUROLOGY

## 2024-10-03 RX ORDER — MEMANTINE HYDROCHLORIDE 10 MG/1
10 TABLET ORAL 2 TIMES DAILY
Qty: 180 TABLET | Refills: 3 | Status: SHIPPED | OUTPATIENT
Start: 2024-10-03

## 2024-10-03 RX ORDER — CETIRIZINE HYDROCHLORIDE 5 MG/1
5 TABLET ORAL DAILY
COMMUNITY

## 2024-10-03 RX ORDER — CLONAZEPAM 0.5 MG/1
0.5 TABLET ORAL 2 TIMES DAILY PRN
COMMUNITY

## 2024-10-03 RX ORDER — DONEPEZIL HYDROCHLORIDE 5 MG/1
5 TABLET, FILM COATED ORAL NIGHTLY
COMMUNITY

## 2024-10-03 ASSESSMENT — PATIENT HEALTH QUESTIONNAIRE - PHQ9
2. FEELING DOWN, DEPRESSED OR HOPELESS: NOT AT ALL
SUM OF ALL RESPONSES TO PHQ QUESTIONS 1-9: 0
SUM OF ALL RESPONSES TO PHQ9 QUESTIONS 1 & 2: 0
SUM OF ALL RESPONSES TO PHQ QUESTIONS 1-9: 0
1. LITTLE INTEREST OR PLEASURE IN DOING THINGS: NOT AT ALL

## 2024-10-04 NOTE — PROGRESS NOTES
as a way of also protecting her memory.  33 minutes spent doing all of the review of records, reviewing her MRI scan, reviewing her CAT scan and CTA of the head and neck today, reviewing her Dopplers, and discussing her diagnosis prognosis and treatment options with her in detail today.  Also encouraged the patient to consider talking to her children about getting power of  and supervision for her financial affairs..  Follow-up and 3 to 4 months time with a nurse practitioner in 6 to 9 months with me.    Signed By: Flavio Horowitz MD     October 3, 2024       CC: Alvina Moore, APRN - NP  FAX: [phone]

## 2024-10-07 ENCOUNTER — TELEPHONE (OUTPATIENT)
Age: 79
End: 2024-10-07

## 2024-10-07 NOTE — TELEPHONE ENCOUNTER
Confirmed speaking to lauri Perez.  Chris states that the patient is confused and thinks that the memantine is to help the patient sleep.  Advised that it is not for sleeping but is a memory pill to delay the process.  Received verbal understanding and patient will take as directed.

## 2024-10-07 NOTE — TELEPHONE ENCOUNTER
Patient son is asking if this memantine should be taken at night time or not. Please call them back at . Thank you.

## 2024-10-11 ENCOUNTER — TELEPHONE (OUTPATIENT)
Age: 79
End: 2024-10-11

## 2024-10-11 NOTE — TELEPHONE ENCOUNTER
Called patient - voicemail box not set up yet so unable to leave voicemail.    Pt DMV Placard form has been completed and signed by provider. Pt can come into the office to pick it up or we can mail it to the patient.

## 2024-10-15 ENCOUNTER — CLINICAL DOCUMENTATION (OUTPATIENT)
Age: 79
End: 2024-10-15

## 2024-10-30 ENCOUNTER — TELEPHONE (OUTPATIENT)
Age: 79
End: 2024-10-30

## 2024-10-30 NOTE — TELEPHONE ENCOUNTER
Called son list on PHI, no answer. Unable to leave voicemail due to mailbox being full.     Noted 10/03/2024     The other problem is that she is having progressive memory loss and dementia, and for that we will increase her Namenda to 10 mg twice a day, and continue the Aricept 5 mg each day.

## 2024-10-30 NOTE — TELEPHONE ENCOUNTER
HIPAA Verified (if caller is someone other than patient): no       Reason for Call:   Change in medication   Medication Name:     memantine (NAMENDA) 10 MG tablet         Additional Notes (as needed):   Patient states that her son Chris would like to know the reason why her    memantine (NAMENDA) 10 MG tablet had been change to her taking the medication 2 times daily      Message: (any additional details from patient/caller not covered above)  He can be reached at [phone]

## 2024-11-27 ENCOUNTER — TELEPHONE (OUTPATIENT)
Age: 79
End: 2024-11-27

## 2024-11-27 NOTE — TELEPHONE ENCOUNTER
Spoke with son Chris listed on PHI. ID patient with two identifiers. Informed him per Dr. Horowitz note     Noted 10/03/2024      The other problem is that she is having progressive memory loss and dementia, and for that we will increase her Namenda to 10 mg twice a day, and continue the Aricept 5 mg each day.      She said thank you, because I wasn't able to make her last appointment.

## 2024-11-27 NOTE — TELEPHONE ENCOUNTER
HIPAA Verified (if caller is someone other than patient): yes       Reason for Call:   Medication question  Medication Name:   memantine (NAMENDA) 10 MG tablet     Patient's/Caller's Question for Nurse/Provider:   Patients son Chris would like to know why patient memantine was increased from 5 mg to 10 mg. Son would like to know if patients diagonsis has gotten worst          Message: (any additional details from patient/caller not covered above)  Son can be reached at [phone]

## 2025-05-06 ENCOUNTER — OFFICE VISIT (OUTPATIENT)
Age: 80
End: 2025-05-06
Payer: MEDICARE

## 2025-05-06 VITALS
BODY MASS INDEX: 23.32 KG/M2 | HEIGHT: 64 IN | WEIGHT: 136.6 LBS | OXYGEN SATURATION: 96 % | TEMPERATURE: 97.7 F | RESPIRATION RATE: 18 BRPM | DIASTOLIC BLOOD PRESSURE: 74 MMHG | SYSTOLIC BLOOD PRESSURE: 120 MMHG | HEART RATE: 73 BPM

## 2025-05-06 DIAGNOSIS — G20.A2 PARKINSON'S DISEASE WITHOUT DYSKINESIA, WITH FLUCTUATING MANIFESTATIONS (HCC): ICD-10-CM

## 2025-05-06 DIAGNOSIS — I65.23 BILATERAL CAROTID ARTERY STENOSIS: ICD-10-CM

## 2025-05-06 DIAGNOSIS — G45.1 TRANSIENT ISCHEMIC ATTACK INVOLVING LEFT INTERNAL CAROTID ARTERY: Primary | ICD-10-CM

## 2025-05-06 DIAGNOSIS — Z86.73 HISTORY OF TIA (TRANSIENT ISCHEMIC ATTACK) AND STROKE: ICD-10-CM

## 2025-05-06 DIAGNOSIS — G30.1 LATE ONSET ALZHEIMER'S DISEASE WITHOUT BEHAVIORAL DISTURBANCE (HCC): ICD-10-CM

## 2025-05-06 DIAGNOSIS — G31.09 TEMPORAL ATROPHY VARIANT OF FRONTOTEMPORAL DEMENTIA (HCC): ICD-10-CM

## 2025-05-06 DIAGNOSIS — F02.80 TEMPORAL ATROPHY VARIANT OF FRONTOTEMPORAL DEMENTIA (HCC): ICD-10-CM

## 2025-05-06 DIAGNOSIS — F02.80 LATE ONSET ALZHEIMER'S DISEASE WITHOUT BEHAVIORAL DISTURBANCE (HCC): ICD-10-CM

## 2025-05-06 PROCEDURE — 99215 OFFICE O/P EST HI 40 MIN: CPT | Performed by: PSYCHIATRY & NEUROLOGY

## 2025-05-06 PROCEDURE — 1036F TOBACCO NON-USER: CPT | Performed by: PSYCHIATRY & NEUROLOGY

## 2025-05-06 PROCEDURE — 1159F MED LIST DOCD IN RCRD: CPT | Performed by: PSYCHIATRY & NEUROLOGY

## 2025-05-06 PROCEDURE — 1123F ACP DISCUSS/DSCN MKR DOCD: CPT | Performed by: PSYCHIATRY & NEUROLOGY

## 2025-05-06 PROCEDURE — G8427 DOCREV CUR MEDS BY ELIG CLIN: HCPCS | Performed by: PSYCHIATRY & NEUROLOGY

## 2025-05-06 PROCEDURE — G8400 PT W/DXA NO RESULTS DOC: HCPCS | Performed by: PSYCHIATRY & NEUROLOGY

## 2025-05-06 PROCEDURE — 1125F AMNT PAIN NOTED PAIN PRSNT: CPT | Performed by: PSYCHIATRY & NEUROLOGY

## 2025-05-06 PROCEDURE — G8420 CALC BMI NORM PARAMETERS: HCPCS | Performed by: PSYCHIATRY & NEUROLOGY

## 2025-05-06 PROCEDURE — 1160F RVW MEDS BY RX/DR IN RCRD: CPT | Performed by: PSYCHIATRY & NEUROLOGY

## 2025-05-06 PROCEDURE — 1090F PRES/ABSN URINE INCON ASSESS: CPT | Performed by: PSYCHIATRY & NEUROLOGY

## 2025-05-06 RX ORDER — CARBIDOPA AND LEVODOPA 25; 100 MG/1; MG/1
1 TABLET ORAL 3 TIMES DAILY
Qty: 100 TABLET | Refills: 11 | Status: SHIPPED | OUTPATIENT
Start: 2025-05-06

## 2025-05-06 ASSESSMENT — PATIENT HEALTH QUESTIONNAIRE - PHQ9
1. LITTLE INTEREST OR PLEASURE IN DOING THINGS: SEVERAL DAYS
SUM OF ALL RESPONSES TO PHQ QUESTIONS 1-9: 2
2. FEELING DOWN, DEPRESSED OR HOPELESS: SEVERAL DAYS
SUM OF ALL RESPONSES TO PHQ QUESTIONS 1-9: 2

## 2025-05-07 NOTE — PROGRESS NOTES
Consult  REFERRED BY:  Alvina Moore APRN - MJ    CHIEF COMPLAINT: We are seeing patient today on work in basis for new problem of some tremors in her hands, and more shuffling gait and feeling tightness in her muscles and difficulty walking, and her son complains that she does not exercise enough and does not move around a lot.  Previously progressive memory loss and patient seen on urgent work in basis after she had a fall at home, and had to go to the hospital, and also because of worsening memory problems despite taking Aricept 5 mg a day and Namenda 5 mg twice a day, which Dr. Lowe put her on 2 to 3 months ago and so far she is tolerating the medication well and her GI issues on the 10 mg of Aricept seem to be getting better.    Subjective:     Susy Bajwa is a 79 y.o. right-handed  female we are asked to evaluate at the request of nurse practitioner Raul on urgent work in basis of new problem of We are seeing patient today on work in basis for new problem of some tremors in her hands, and more shuffling gait and feeling tightness in her muscles and difficulty walking, and her son complains that she does not exercise enough and does not move around a lot.  Previously progressive memory loss and patient seen on urgent work in basis after she had a fall at home, and had to go to the hospital, and also because of worsening memory problems despite taking Aricept 5 mg a day and Namenda 5 mg twice a day, which Dr. Lowe put her on 2 to 3 months ago and so far she is tolerating the medication well and her GI issues on the 10 mg of Aricept seem to be getting better.  Patient having more trouble being managed at home, and had a fall having taken to the ER for treatment, and she seems to be doing better now, but her memory is slipping, if she wants to know what she can do for the memory, and she is already on 5 mg of Aricept once a day and taking Namenda 5 mg twice a day after that was started by her PCP